# Patient Record
Sex: FEMALE | Race: WHITE | URBAN - METROPOLITAN AREA
[De-identification: names, ages, dates, MRNs, and addresses within clinical notes are randomized per-mention and may not be internally consistent; named-entity substitution may affect disease eponyms.]

---

## 2019-09-21 ENCOUNTER — APPOINTMENT (OUTPATIENT)
Dept: CT IMAGING | Facility: CLINIC | Age: 54
DRG: 897 | End: 2019-09-21
Attending: EMERGENCY MEDICINE
Payer: COMMERCIAL

## 2019-09-21 ENCOUNTER — APPOINTMENT (OUTPATIENT)
Dept: GENERAL RADIOLOGY | Facility: CLINIC | Age: 54
DRG: 897 | End: 2019-09-21
Attending: EMERGENCY MEDICINE
Payer: COMMERCIAL

## 2019-09-21 ENCOUNTER — HOSPITAL ENCOUNTER (INPATIENT)
Facility: CLINIC | Age: 54
LOS: 3 days | Discharge: HOME | DRG: 897 | End: 2019-09-24
Attending: EMERGENCY MEDICINE | Admitting: PSYCHIATRY & NEUROLOGY
Payer: COMMERCIAL

## 2019-09-21 DIAGNOSIS — F10.129 CHRONIC ALCOHOLIC INTOXICATION WITH COMPLICATION (H): ICD-10-CM

## 2019-09-21 DIAGNOSIS — S09.90XA INJURY OF HEAD, INITIAL ENCOUNTER: ICD-10-CM

## 2019-09-21 DIAGNOSIS — S42.034K: ICD-10-CM

## 2019-09-21 DIAGNOSIS — L03.311 ABDOMINAL WALL CELLULITIS: ICD-10-CM

## 2019-09-21 DIAGNOSIS — Z91.81 PERSONAL HISTORY OF FALL: ICD-10-CM
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
## 2019-09-21 DIAGNOSIS — F10.920 ALCOHOLIC INTOXICATION WITHOUT COMPLICATION (H): ICD-10-CM

## 2019-09-21 DIAGNOSIS — S22.42XA CLOSED FRACTURE OF MULTIPLE RIBS OF LEFT SIDE, INITIAL ENCOUNTER: ICD-10-CM

## 2019-09-21 DIAGNOSIS — S42.001S CLOSED NONDISPLACED FRACTURE OF RIGHT CLAVICLE, UNSPECIFIED PART OF CLAVICLE, SEQUELA: Primary | ICD-10-CM

## 2019-09-21 DIAGNOSIS — S42.031K CLOSED DISPLACED FRACTURE OF ACROMIAL END OF RIGHT CLAVICLE WITH NONUNION, SUBSEQUENT ENCOUNTER: ICD-10-CM

## 2019-09-21 PROBLEM — F10.230 ALCOHOL DEPENDENCE WITH UNCOMPLICATED WITHDRAWAL (H): Status: ACTIVE | Noted: 2019-09-21

## 2019-09-21 LAB
ALBUMIN SERPL-MCNC: 3.4 G/DL (ref 3.4–5)
ALBUMIN UR-MCNC: NEGATIVE MG/DL
ALCOHOL BREATH TEST: ABNORMAL (ref 0–0.01)
ALP SERPL-CCNC: 65 U/L (ref 40–150)
ALT SERPL W P-5'-P-CCNC: 48 U/L (ref 0–50)
AMPHETAMINES UR QL SCN: NEGATIVE
ANION GAP SERPL CALCULATED.3IONS-SCNC: 9 MMOL/L (ref 3–14)
APPEARANCE UR: CLEAR
AST SERPL W P-5'-P-CCNC: ABNORMAL U/L (ref 0–45)
BARBITURATES UR QL: NEGATIVE
BASOPHILS # BLD AUTO: 0 10E9/L (ref 0–0.2)
BASOPHILS NFR BLD AUTO: 0.2 %
BENZODIAZ UR QL: NEGATIVE
BILIRUB SERPL-MCNC: 0.3 MG/DL (ref 0.2–1.3)
BILIRUB UR QL STRIP: NEGATIVE
BUN SERPL-MCNC: 11 MG/DL (ref 7–30)
CALCIUM SERPL-MCNC: 8.9 MG/DL (ref 8.5–10.1)
CANNABINOIDS UR QL SCN: NEGATIVE
CHLORIDE SERPL-SCNC: 99 MMOL/L (ref 94–109)
CO2 SERPL-SCNC: 31 MMOL/L (ref 20–32)
COCAINE UR QL: NEGATIVE
COLOR UR AUTO: YELLOW
CREAT SERPL-MCNC: 0.38 MG/DL (ref 0.52–1.04)
DIFFERENTIAL METHOD BLD: ABNORMAL
EOSINOPHIL # BLD AUTO: 0 10E9/L (ref 0–0.7)
EOSINOPHIL NFR BLD AUTO: 0.3 %
ERYTHROCYTE [DISTWIDTH] IN BLOOD BY AUTOMATED COUNT: 14.8 % (ref 10–15)
ETHANOL UR QL SCN: POSITIVE
GFR SERPL CREATININE-BSD FRML MDRD: >90 ML/MIN/{1.73_M2}
GLUCOSE SERPL-MCNC: 81 MG/DL (ref 70–99)
GLUCOSE UR STRIP-MCNC: 150 MG/DL
HCT VFR BLD AUTO: 37.9 % (ref 35–47)
HGB BLD-MCNC: 13 G/DL (ref 11.7–15.7)
HGB UR QL STRIP: NEGATIVE
IMM GRANULOCYTES # BLD: 0 10E9/L (ref 0–0.4)
IMM GRANULOCYTES NFR BLD: 0.5 %
INR PPP: 0.97 (ref 0.86–1.14)
KETONES UR STRIP-MCNC: NEGATIVE MG/DL
LEUKOCYTE ESTERASE UR QL STRIP: NEGATIVE
LYMPHOCYTES # BLD AUTO: 2 10E9/L (ref 0.8–5.3)
LYMPHOCYTES NFR BLD AUTO: 30.5 %
MAGNESIUM SERPL-MCNC: 1.8 MG/DL (ref 1.6–2.3)
MCH RBC QN AUTO: 34.7 PG (ref 26.5–33)
MCHC RBC AUTO-ENTMCNC: 34.3 G/DL (ref 31.5–36.5)
MCV RBC AUTO: 101 FL (ref 78–100)
MONOCYTES # BLD AUTO: 0.7 10E9/L (ref 0–1.3)
MONOCYTES NFR BLD AUTO: 11.2 %
MUCOUS THREADS #/AREA URNS LPF: PRESENT /LPF
NEUTROPHILS # BLD AUTO: 3.8 10E9/L (ref 1.6–8.3)
NEUTROPHILS NFR BLD AUTO: 57.3 %
NITRATE UR QL: NEGATIVE
NRBC # BLD AUTO: 0 10*3/UL
NRBC BLD AUTO-RTO: 0 /100
OPIATES UR QL SCN: NEGATIVE
PH UR STRIP: 6.5 PH (ref 5–7)
PLATELET # BLD AUTO: 285 10E9/L (ref 150–450)
POTASSIUM SERPL-SCNC: 3.3 MMOL/L (ref 3.4–5.3)
POTASSIUM SERPL-SCNC: 6.2 MMOL/L (ref 3.4–5.3)
PROT SERPL-MCNC: 7.3 G/DL (ref 6.8–8.8)
RBC # BLD AUTO: 3.75 10E12/L (ref 3.8–5.2)
RBC #/AREA URNS AUTO: <1 /HPF (ref 0–2)
SODIUM SERPL-SCNC: 139 MMOL/L (ref 133–144)
SOURCE: ABNORMAL
SP GR UR STRIP: 1.02 (ref 1–1.03)
SQUAMOUS #/AREA URNS AUTO: 3 /HPF (ref 0–1)
UROBILINOGEN UR STRIP-MCNC: NORMAL MG/DL (ref 0–2)
VALPROATE SERPL-MCNC: 61 MG/L (ref 50–100)
WBC # BLD AUTO: 6.6 10E9/L (ref 4–11)
WBC #/AREA URNS AUTO: <1 /HPF (ref 0–5)

## 2019-09-21 PROCEDURE — 80320 DRUG SCREEN QUANTALCOHOLS: CPT | Performed by: EMERGENCY MEDICINE

## 2019-09-21 PROCEDURE — 85610 PROTHROMBIN TIME: CPT | Performed by: EMERGENCY MEDICINE

## 2019-09-21 PROCEDURE — 25000125 ZZHC RX 250: Performed by: EMERGENCY MEDICINE

## 2019-09-21 PROCEDURE — 25000132 ZZH RX MED GY IP 250 OP 250 PS 637: Performed by: EMERGENCY MEDICINE

## 2019-09-21 PROCEDURE — 82565 ASSAY OF CREATININE: CPT

## 2019-09-21 PROCEDURE — 80164 ASSAY DIPROPYLACETIC ACD TOT: CPT | Performed by: EMERGENCY MEDICINE

## 2019-09-21 PROCEDURE — 83735 ASSAY OF MAGNESIUM: CPT | Performed by: EMERGENCY MEDICINE

## 2019-09-21 PROCEDURE — HZ2ZZZZ DETOXIFICATION SERVICES FOR SUBSTANCE ABUSE TREATMENT: ICD-10-PCS | Performed by: PSYCHIATRY & NEUROLOGY

## 2019-09-21 PROCEDURE — 84155 ASSAY OF PROTEIN SERUM: CPT

## 2019-09-21 PROCEDURE — 25000131 ZZH RX MED GY IP 250 OP 636 PS 637: Performed by: EMERGENCY MEDICINE

## 2019-09-21 PROCEDURE — 84132 ASSAY OF SERUM POTASSIUM: CPT

## 2019-09-21 PROCEDURE — 84295 ASSAY OF SERUM SODIUM: CPT

## 2019-09-21 PROCEDURE — 84132 ASSAY OF SERUM POTASSIUM: CPT | Performed by: EMERGENCY MEDICINE

## 2019-09-21 PROCEDURE — 84075 ASSAY ALKALINE PHOSPHATASE: CPT

## 2019-09-21 PROCEDURE — 82435 ASSAY OF BLOOD CHLORIDE: CPT

## 2019-09-21 PROCEDURE — 73030 X-RAY EXAM OF SHOULDER: CPT | Mod: RT

## 2019-09-21 PROCEDURE — 12800008 ZZH R&B CD ADULT

## 2019-09-21 PROCEDURE — 84520 ASSAY OF UREA NITROGEN: CPT

## 2019-09-21 PROCEDURE — 84460 ALANINE AMINO (ALT) (SGPT): CPT

## 2019-09-21 PROCEDURE — 25800025 ZZH RX 258: Performed by: EMERGENCY MEDICINE

## 2019-09-21 PROCEDURE — 99285 EMERGENCY DEPT VISIT HI MDM: CPT | Mod: 25 | Performed by: EMERGENCY MEDICINE

## 2019-09-21 PROCEDURE — 82075 ASSAY OF BREATH ETHANOL: CPT | Performed by: EMERGENCY MEDICINE

## 2019-09-21 PROCEDURE — 25000128 H RX IP 250 OP 636: Performed by: EMERGENCY MEDICINE

## 2019-09-21 PROCEDURE — 93010 ELECTROCARDIOGRAM REPORT: CPT | Mod: Z6 | Performed by: EMERGENCY MEDICINE

## 2019-09-21 PROCEDURE — 82947 ASSAY GLUCOSE BLOOD QUANT: CPT

## 2019-09-21 PROCEDURE — 96366 THER/PROPH/DIAG IV INF ADDON: CPT | Performed by: EMERGENCY MEDICINE

## 2019-09-21 PROCEDURE — 96365 THER/PROPH/DIAG IV INF INIT: CPT | Mod: 59 | Performed by: EMERGENCY MEDICINE

## 2019-09-21 PROCEDURE — 80307 DRUG TEST PRSMV CHEM ANLYZR: CPT | Performed by: EMERGENCY MEDICINE

## 2019-09-21 PROCEDURE — 74177 CT ABD & PELVIS W/CONTRAST: CPT

## 2019-09-21 PROCEDURE — 82040 ASSAY OF SERUM ALBUMIN: CPT

## 2019-09-21 PROCEDURE — 82374 ASSAY BLOOD CARBON DIOXIDE: CPT

## 2019-09-21 PROCEDURE — 85025 COMPLETE CBC W/AUTO DIFF WBC: CPT | Performed by: EMERGENCY MEDICINE

## 2019-09-21 PROCEDURE — 71260 CT THORAX DX C+: CPT

## 2019-09-21 PROCEDURE — 82247 BILIRUBIN TOTAL: CPT

## 2019-09-21 PROCEDURE — 72125 CT NECK SPINE W/O DYE: CPT

## 2019-09-21 PROCEDURE — 93005 ELECTROCARDIOGRAM TRACING: CPT | Performed by: EMERGENCY MEDICINE

## 2019-09-21 PROCEDURE — 81001 URINALYSIS AUTO W/SCOPE: CPT | Performed by: EMERGENCY MEDICINE

## 2019-09-21 PROCEDURE — 25000132 ZZH RX MED GY IP 250 OP 250 PS 637: Performed by: PSYCHIATRY & NEUROLOGY

## 2019-09-21 PROCEDURE — 70450 CT HEAD/BRAIN W/O DYE: CPT

## 2019-09-21 PROCEDURE — 82310 ASSAY OF CALCIUM: CPT

## 2019-09-21 RX ORDER — BISACODYL 10 MG
10 SUPPOSITORY, RECTAL RECTAL DAILY PRN
Status: DISCONTINUED | OUTPATIENT
Start: 2019-09-21 | End: 2019-09-24 | Stop reason: HOSPADM

## 2019-09-21 RX ORDER — LANOLIN ALCOHOL/MO/W.PET/CERES
100 CREAM (GRAM) TOPICAL DAILY
Status: DISCONTINUED | OUTPATIENT
Start: 2019-09-22 | End: 2019-09-24 | Stop reason: HOSPADM

## 2019-09-21 RX ORDER — MULTIPLE VITAMINS W/ MINERALS TAB 9MG-400MCG
1 TAB ORAL DAILY
Status: DISCONTINUED | OUTPATIENT
Start: 2019-09-22 | End: 2019-09-24 | Stop reason: HOSPADM

## 2019-09-21 RX ORDER — BISACODYL 10 MG
10 SUPPOSITORY, RECTAL RECTAL ONCE
Status: COMPLETED | OUTPATIENT
Start: 2019-09-21 | End: 2019-09-21

## 2019-09-21 RX ORDER — ACETAMINOPHEN 325 MG/1
650 TABLET ORAL EVERY 4 HOURS PRN
Status: DISCONTINUED | OUTPATIENT
Start: 2019-09-21 | End: 2019-09-24 | Stop reason: HOSPADM

## 2019-09-21 RX ORDER — ATENOLOL 50 MG/1
50 TABLET ORAL DAILY PRN
Status: DISCONTINUED | OUTPATIENT
Start: 2019-09-21 | End: 2019-09-24 | Stop reason: HOSPADM

## 2019-09-21 RX ORDER — HYDROXYZINE HYDROCHLORIDE 25 MG/1
25 TABLET, FILM COATED ORAL EVERY 4 HOURS PRN
Status: DISCONTINUED | OUTPATIENT
Start: 2019-09-21 | End: 2019-09-24 | Stop reason: HOSPADM

## 2019-09-21 RX ORDER — TRAZODONE HYDROCHLORIDE 100 MG/1
300 TABLET ORAL AT BEDTIME
Status: ON HOLD | COMMUNITY
End: 2019-09-24

## 2019-09-21 RX ORDER — PAROXETINE 10 MG/1
20 TABLET, FILM COATED ORAL EVERY EVENING
Status: DISCONTINUED | OUTPATIENT
Start: 2019-09-21 | End: 2019-09-24 | Stop reason: HOSPADM

## 2019-09-21 RX ORDER — IOPAMIDOL 755 MG/ML
100 INJECTION, SOLUTION INTRAVASCULAR ONCE
Status: COMPLETED | OUTPATIENT
Start: 2019-09-21 | End: 2019-09-21

## 2019-09-21 RX ORDER — LOPERAMIDE HCL 2 MG
2 CAPSULE ORAL 4 TIMES DAILY PRN
Status: DISCONTINUED | OUTPATIENT
Start: 2019-09-21 | End: 2019-09-24 | Stop reason: HOSPADM

## 2019-09-21 RX ORDER — DIVALPROEX SODIUM 125 MG/1
125 TABLET, DELAYED RELEASE ORAL 3 TIMES DAILY
COMMUNITY
End: 2019-09-21

## 2019-09-21 RX ORDER — PAROXETINE 20 MG/1
20 TABLET, FILM COATED ORAL EVERY EVENING
Status: ON HOLD | COMMUNITY
End: 2019-09-24

## 2019-09-21 RX ORDER — CLINDAMYCIN HCL 300 MG
300 CAPSULE ORAL EVERY 8 HOURS SCHEDULED
Status: DISCONTINUED | OUTPATIENT
Start: 2019-09-21 | End: 2019-09-21

## 2019-09-21 RX ORDER — ONDANSETRON 4 MG/1
4 TABLET, ORALLY DISINTEGRATING ORAL ONCE
Status: COMPLETED | OUTPATIENT
Start: 2019-09-21 | End: 2019-09-21

## 2019-09-21 RX ORDER — CLINDAMYCIN HCL 300 MG
300 CAPSULE ORAL EVERY 8 HOURS SCHEDULED
Status: DISCONTINUED | OUTPATIENT
Start: 2019-09-21 | End: 2019-09-22

## 2019-09-21 RX ORDER — ALUMINA, MAGNESIA, AND SIMETHICONE 2400; 2400; 240 MG/30ML; MG/30ML; MG/30ML
30 SUSPENSION ORAL EVERY 4 HOURS PRN
Status: DISCONTINUED | OUTPATIENT
Start: 2019-09-21 | End: 2019-09-24 | Stop reason: HOSPADM

## 2019-09-21 RX ORDER — TRAZODONE HYDROCHLORIDE 150 MG/1
300 TABLET ORAL AT BEDTIME
Status: DISCONTINUED | OUTPATIENT
Start: 2019-09-21 | End: 2019-09-24 | Stop reason: HOSPADM

## 2019-09-21 RX ORDER — FOLIC ACID 1 MG/1
1 TABLET ORAL DAILY
Status: DISCONTINUED | OUTPATIENT
Start: 2019-09-22 | End: 2019-09-24 | Stop reason: HOSPADM

## 2019-09-21 RX ORDER — LORAZEPAM 1 MG/1
1-4 TABLET ORAL EVERY 30 MIN PRN
Status: DISCONTINUED | OUTPATIENT
Start: 2019-09-21 | End: 2019-09-24 | Stop reason: HOSPADM

## 2019-09-21 RX ORDER — DIVALPROEX SODIUM 250 MG/1
750 TABLET, EXTENDED RELEASE ORAL DAILY
Status: ON HOLD | COMMUNITY
End: 2019-09-24

## 2019-09-21 RX ORDER — DIVALPROEX SODIUM 250 MG/1
750 TABLET, EXTENDED RELEASE ORAL EVERY EVENING
Status: DISCONTINUED | OUTPATIENT
Start: 2019-09-21 | End: 2019-09-24 | Stop reason: HOSPADM

## 2019-09-21 RX ORDER — ONDANSETRON 4 MG/1
4 TABLET, ORALLY DISINTEGRATING ORAL EVERY 6 HOURS PRN
Status: DISCONTINUED | OUTPATIENT
Start: 2019-09-21 | End: 2019-09-22

## 2019-09-21 RX ORDER — DIVALPROEX SODIUM 250 MG/1
750 TABLET, DELAYED RELEASE ORAL DAILY
Status: ON HOLD | COMMUNITY
End: 2019-09-21

## 2019-09-21 RX ORDER — IBUPROFEN 600 MG/1
600 TABLET, FILM COATED ORAL EVERY 6 HOURS PRN
Status: DISCONTINUED | OUTPATIENT
Start: 2019-09-21 | End: 2019-09-24 | Stop reason: HOSPADM

## 2019-09-21 RX ADMIN — SODIUM CHLORIDE 54 ML: 9 INJECTION, SOLUTION INTRAVENOUS at 14:59

## 2019-09-21 RX ADMIN — ONDANSETRON 4 MG: 4 TABLET, ORALLY DISINTEGRATING ORAL at 19:25

## 2019-09-21 RX ADMIN — BISACODYL 10 MG: 10 SUPPOSITORY RECTAL at 18:53

## 2019-09-21 RX ADMIN — CLINDAMYCIN HYDROCHLORIDE 300 MG: 300 CAPSULE ORAL at 17:25

## 2019-09-21 RX ADMIN — LORAZEPAM 2 MG: 1 TABLET ORAL at 21:03

## 2019-09-21 RX ADMIN — PAROXETINE 20 MG: 10 TABLET, FILM COATED ORAL at 21:03

## 2019-09-21 RX ADMIN — TRAZODONE HYDROCHLORIDE 300 MG: 150 TABLET, FILM COATED ORAL at 21:03

## 2019-09-21 RX ADMIN — DIVALPROEX SODIUM 750 MG: 250 TABLET, FILM COATED, EXTENDED RELEASE ORAL at 21:03

## 2019-09-21 RX ADMIN — IOPAMIDOL 54 ML: 755 INJECTION, SOLUTION INTRAVENOUS at 14:58

## 2019-09-21 RX ADMIN — FOLIC ACID: 5 INJECTION, SOLUTION INTRAMUSCULAR; INTRAVENOUS; SUBCUTANEOUS at 13:10

## 2019-09-21 ASSESSMENT — ENCOUNTER SYMPTOMS
ARTHRALGIAS: 1
ABDOMINAL PAIN: 1
HEADACHES: 1

## 2019-09-21 ASSESSMENT — ACTIVITIES OF DAILY LIVING (ADL)
HYGIENE/GROOMING: INDEPENDENT
DRESS: STREET CLOTHES;SCRUBS (BEHAVIORAL HEALTH);INDEPENDENT
LAUNDRY: WITH SUPERVISION
ORAL_HYGIENE: INDEPENDENT

## 2019-09-21 NOTE — ED NOTES
ED to Behavioral Floor Handoff    SITUATION  Celia Jeronimo is a 54 year old female who speaks English and lives in a home with a spouse The patient arrived in the ED by private car from home with a complaint of Alcohol Intoxication (Blew a 0.199, Seeking detox.  Drank 2 beers this AM.  Drank 1 pint vodka yesterday.  Had first drink at age 9 - long history with alcohol she said.  Hx of seizures.  Says she brought her meds and has some mental health problems as well.)  .The patient's current symptoms started/worsened 1 month(s) ago and during this time the symptoms have increased.   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 No   If yes, last blood glucose? --     If yes, was this treated in the ED? --  --------  Is the patient inebriated (ETOH) Yes or Impaired on other substances? No  MSSA done? Yes  Last MSSA score: 4    Were withdrawal symptoms treated? No  Does the patient have a seizure history? Yes. If yes, date of most recent seizure-- August 2018  --------  Is the patient patient experiencing suicidal ideation? denies current or recent suicidal ideation     Homicidal ideation? denies current or recent homicidal ideation or behaviors.    Self-injurious behavior/urges? denies current or recent self injurious behavior or ideation.  ------  Was pt aggressive in the ED No  Was a code called No  Is the pt now cooperative? Yes  -------  Meds given in ED:   Medications   clindamycin (CLEOCIN) capsule 300 mg (300 mg Oral Given 9/21/19 1725)   dextrose 5% and 0.45% NaCl 1,000 mL with INFUVITE ADULT 10 mL,  thiamine 100 mg, folic acid 1 mg infusion ( Intravenous Stopped 9/21/19 1502)   iopamidol (ISOVUE-370) solution 100 mL (54 mLs Intravenous Given 9/21/19 1458)   sodium chloride 0.9 % bag 500mL for CT scan flush use (54 mLs Intravenous Given 9/21/19 1459)      Family present during ED course? Yes  Family currently present? No    BACKGROUND  Does the patient have a cognitive impairment or developmental disability? No  Allergies:   Allergies   Allergen Reactions     Penicillins Swelling     PN: allergic to mold, mold allergies got worse after she took Penicillin  allergic to mold, mold allergies got worse after she took Penicillin,        Macrolides      Metronidazole Nausea and Vomiting     Telbivudine      Valium [Diazepam] Other (See Comments)     manic     Clarithromycin Other (See Comments) and Rash     PN:  facial skin eruption     Erythromycin Rash   .   Social demographics are   Social History     Socioeconomic History     Marital status:      Spouse name: None     Number of children: None     Years of education: None     Highest education level: None   Occupational History     None   Social Needs     Financial resource strain: None     Food insecurity:     Worry: None     Inability: None     Transportation needs:     Medical: None     Non-medical: None   Tobacco Use     Smoking status: Current Every Day Smoker     Packs/day: 0.50     Smokeless tobacco: Never Used   Substance and Sexual Activity     Alcohol use: Yes     Comment: drinks daily, pint a day     Drug use: Never     Sexual activity: None   Lifestyle     Physical activity:     Days per week: None     Minutes per session: None     Stress: None   Relationships     Social connections:     Talks on phone: None     Gets together: None     Attends [employer] service: None     Active member of club or organization: None     Attends meetings of clubs or organizations: None     Relationship status: None     Intimate partner violence:     Fear of current  or ex partner: None     Emotionally abused: None     Physically abused: None     Forced sexual activity: None   Other Topics Concern     None   Social History Narrative     None        ASSESSMENT  Labs results   Labs Ordered and Resulted from Time of ED Arrival Up to the Time of Departure from the ED   CBC WITH PLATELETS DIFFERENTIAL - Abnormal; Notable for the following components:       Result Value    RBC Count 3.75 (*)      (*)     MCH 34.7 (*)     All other components within normal limits   COMPREHENSIVE METABOLIC PANEL - Abnormal; Notable for the following components:    Potassium 6.2 (*)     Creatinine 0.38 (*)     All other components within normal limits   ROUTINE UA WITH MICROSCOPIC - Abnormal; Notable for the following components:    Glucose Urine 150 (*)     Squamous Epithelial /HPF Urine 3 (*)     Mucous Urine Present (*)     All other components within normal limits   DRUG ABUSE SCREEN 6 CHEM DEP URINE ([facility]) - Abnormal; Notable for the following components:    Ethanol Qual Urine Positive (*)     All other components within normal limits   POTASSIUM - Abnormal; Notable for the following components:    Potassium 3.3 (*)     All other components within normal limits   ALCOHOL BREATH TEST POCT - Abnormal   INR   MAGNESIUM   VALPROIC ACID      Imaging Studies:   Recent Results (from the past 24 hour(s))   XR Shoulder Right G/E 3 Views    Narrative    RIGHT SHOULDER THREE OR MORE VIEWS   9/21/2019 2:44 PM     HISTORY: Right shoulder pain status post fall.    COMPARISON: None.     FINDINGS:  There is a chronic distal clavicular fracture at the level  of the coracoclavicular ligament origin with inferior displacement of  the distal fragment. No acute fracture or malalignment is seen.  Acromioclavicular and glenohumeral spaces are well-maintained. There  is mild osteopenia. Visualized portions of the lungs are grossly  clear. Humeral head is well located within the glenoid fossa.      Impression     IMPRESSION:  1. Displaced, chronic distal clavicular fracture with nonunion.  2. No acute fracture is identified. No obvious acute malalignment.    CT Chest/Abdomen/Pelvis w Contrast    Narrative    CT CHEST, ABDOMEN, PELVIS WITH CONTRAST  9/21/2019 2:59 PM    HISTORY: Right clavicle pain, bilateral rib pain, epigastric pain  status post multiple falls, intoxicated.    TECHNIQUE: CT scan obtained of the chest, abdomen, and pelvis with  oral and IV contrast. 54 mL Isovue 370 IV injected. Radiation dose for  this scan was reduced using automated exposure control, adjustment of  the mA and/or kV according to patient size, or iterative  reconstruction technique.    COMPARISON:  None available.    FINDINGS:  Chest: Visualized portions of the thyroid gland are unremarkable.    No supraclavicular, axillary, mediastinal or hilar lymphadenopathy is  seen.    Heart size is normal. No pericardial effusion is seen. Scattered  coronary artery calcifications are present. Scattered atherosclerotic  calcification seen in the thoracic aorta. Pulmonary trunk is of normal  course and caliber.    No pleural effusion or pneumothorax is seen. Dependent subsegmental  atelectasis is seen in the bilateral lower lobes. No focal  consolidation is present. Calcified granuloma noted within the left  upper lobe.    Abdomen/pelvis: Diffuse fatty infiltration seen in the liver. No  intrahepatic or extrahepatic biliary ductal dilatation is present.  Gallbladder is unremarkable.    The spleen, adrenal glands and pancreas are unremarkable. Kidneys  enhance symmetrically. No hydronephrosis is seen.    Stomach is moderately distended with ingested contents and air. Small  and large bowel loops are nondilated. Postsurgical changes are noted  at the rectosigmoid junction. Scattered colonic diverticulosis without  evidence of diverticulitis. Mild colonic wall thickening noted within  the ascending colon and cecum. No significant adjacent pericolonic  fat  stranding seen.    Scattered atherosclerotic calcifications seen in the abdominal aorta.  IVC is of normal course and caliber. No retroperitoneal or mesenteric  lymphadenopathy is seen.    Urinary bladder is moderately distended. Uterus is surgically absent.  No pelvic lymphadenopathy is seen.    Displaced clavicular fragments from chronic distal clavicular fracture  with nonunion. Multiple healing and healed fractures noted along the  left lateral and posterior ribs. No suspicious osseous lesions seen.  Incomplete fusion involving the right transverse process of the L3  vertebral level.      Impression    IMPRESSION:   1.  Mild wall thickening involving the ascending colon and cecum. No  significant pericolonic fat stranding is seen. Findings may suggest  mild infectious versus inflammatory colitis.  2.  Displaced, chronic right distal clavicular fracture with nonunion.  3.  Multiple subacute to chronic left lateral and posterior rib  fractures.  4.  Diffuse hepatic steatosis.    KATHY MCGRATH MD   Head CT w/o contrast    Narrative    CT SCAN OF THE HEAD WITHOUT CONTRAST   9/21/2019 3:11 PM     HISTORY: Numerous falls with head trauma, intoxicated.    TECHNIQUE:  Axial images of the head and coronal reformations without  IV contrast material. Radiation dose for this scan was reduced using  automated exposure control, adjustment of the mA and/or kV according  to patient size, or iterative reconstruction technique.    COMPARISON: None.    FINDINGS: There is no evidence of intracranial hemorrhage, mass, acute  infarct or anomaly. The ventricles are normal in size, shape and  configuration. Mild diffuse parenchymal volume loss. Mild patchy  periventricular white matter hypodensities which are nonspecific, but  likely related to chronic microvascular ischemic disease.     Sequela of previous sinonasal surgery. Marked mucosal thickening in  the visualized left maxillary sinus. The bony calvarium and bones of  the  skull base appear intact.       Impression    IMPRESSION:     1. No evidence of acute intracranial hemorrhage, mass, or herniation.  2. Mild diffuse parenchymal volume loss and white matter changes  likely due to chronic microvascular ischemic disease.      YESICA RAVI MD   CT Cervical Spine w/o Contrast    Narrative    CT CERVICAL SPINE WITHOUT CONTRAST   9/21/2019 3:13 PM     HISTORY: Multiple falls, diffuse trauma, intoxicated.     TECHNIQUE: Axial images of the cervical spine were obtained without  intravenous contrast. Multiplanar reformations were performed.   Radiation dose for this scan was reduced using automated exposure  control, adjustment of the mA and/or kV according to patient size, or  iterative reconstruction technique.    COMPARISON: None.    FINDINGS: No evidence of fracture. No prevertebral soft tissue  swelling. Straightening of the normal cervical lordosis. Vertebral  body height is maintained. No destructive osseous lesions. Severe loss  of intervertebral disc space and degenerative endplate changes at the  C5-6 level. Mild degenerative changes and loss of disc height at C3-4  and C4-5. Mild spinal canal narrowing at the C5-6 level particularly  on the left. Severe left and moderate right neural foraminal narrowing  at C5-6.    Visualized paraspinous tissues: Unremarkable.      Impression    IMPRESSION:   1. No evidence of acute fracture or subluxation in the cervical spine.  2. Degenerative changes in the cervical spine most pronounced at C5-6  level.       YESICA RAVI MD      Most recent vital signs /65   Pulse 81   Temp 97.8  F (36.6  C) (Oral)   Resp 16   Wt 50.3 kg (111 lb)   SpO2 94%    Abnormal labs/tests/findings requiring intervention:---   Pain control: pt had none  Nausea control: pt had none    RECOMMENDATION  Are any infection precautions needed (MRSA, VRE, etc.)? No If yes, what infection? --  ---  Does the patient have mobility issues? independently. If yes, what  device does the pt use? ---  ---  Is patient on 72 hour hold or commitment? No If on 72 hour hold, have hold and rights been given to patient? N/A  Are admitting orders written if after 10 p.m. ?N/A  Tasks needing to be completed:---     Gayatri Wong, RN   ED RN [id number]

## 2019-09-21 NOTE — ED NOTES
Patient was able to eat supper independently. No c/o nausea. Patient was able to ambulate independently to toilet.

## 2019-09-21 NOTE — PHARMACY-ADMISSION MEDICATION HISTORY
Admission medication history for the September 21, 2019 admission is complete.     Interview Sources:  Patient and Outside Dispense Report    Reliability of Source: Good, patient knew most medications and dosing.    Medication Adherence: Good, patient fills medication every month and reports good adherence.    Current Outpatient Pharmacy: [facility] - [address]      Changes made to PTA medication list (reason)  Added: None  Deleted: None  Changed: paroxetine 20 mg; take 1 tablet every morning --> paroxetine 20 mg; take 1 tablet every evening (per patient, takes all her medication at night)    Additional medication history information:   None    Prior to Admission Medication List:  Prior to Admission medications    Medication Sig Last Dose Taking? Auth Provider   divalproex sodium extended-release (DEPAKOTE) 250 MG ER tablet Take 750 mg by mouth daily 9/20/2019 at pm Yes Reported, Patient   PARoxetine (PAXIL) 20 MG tablet Take 20 mg by mouth every evening  9/20/2019 at pm Yes Reported, Patient   traZODone (DESYREL) 100 MG tablet Take 300 mg by mouth At Bedtime 9/20/2019 at pm Yes Reported, Patient       Time spent: 20 minutes    Medication history completed by:   Jody Garnica, Pharmacy Intern

## 2019-09-21 NOTE — ED PROVIDER NOTES
"    [facility] EMERGENCY DEPARTMENT ([facility])    9/21/19     ED 11 12:24 PM   History     Chief Complaint   Patient presents with     Alcohol Intoxication     Blew a 0.199, Seeking detox.  Drank 2 beers this AM.  Drank 1 pint vodka yesterday.  Had first drink at age 9 - long history with alcohol she said.  Hx of seizures.  Says she brought her meds and has some mental health problems as well.     The history is provided by the patient and medical records.     Celia Jeronimo is a 54 year old female who presents seeking detox from alcohol. She has a history of alcohol abuse as well as seizures though felt to be unrelated to withdrawal.  She has been drinking 1 pint of vodka daily 4-6 days out of the week.  She last drank vodka last night and reports drinking 2 beers this morning prior to arrival (~10:30 am).  She also has multiple falls with multiple bruises but can't recall when these falls took place or the nature of the falls themselves. She has an abrasion on her abdomen after falling yesterday or the day before.  She states she fell over an antique chair and broke this, is worried for infection with possible wood splinters embedded in her abdomen. She has had multiple head injuries from falls as well, has some tenderness to back of her head. She also has some right shoulder injuries after fall with injury, wonders if she needs surgery for this. She has had prior surgery in this right shoulder but has noticed \"crumbly\" sensation in right shoulder.  She notes history of grand mal seizure in last year while watching a movie. At that time she was sober for several days prior to the seizure. She states she was inpatient for a week with this (per [facility] took place from 12/6-12/9/2018 at [facility].  At that time she had a witnessed tonic-clonic seizure in setting of mild alcohol withdrawal, sleep deprivation and subtherapeutic Depakote levels.  Work-up was entertained.  She " had abnormal EEG in July notable for left posterior temporal dysfunction epileptogenicity.  Plan was for patient to increase her Depakote levels.  No history of DTs. No other substance use. No vomiting, abdominal pain, diarrhea, bloody stools or black stools. No fevers. She notes being considered bipolar with borderline personality disorder in the past, is on Depakote, Paxil, trazodone for sleep and has these medications with her. No suicidal ideation. Is not on anticoagulation.     I have reviewed the Medications, Allergies, Past Medical and Surgical History, and Social History in the [employer] system.  PAST MEDICAL HISTORY: No past medical history on file.    PAST SURGICAL HISTORY: No past surgical history on file.    FAMILY HISTORY: No family history on file.    SOCIAL HISTORY:   Social History     Tobacco Use     Smoking status: Not on file   Substance Use Topics     Alcohol use: Not on file       Patient's Medications   New Prescriptions    No medications on file   Previous Medications    DIVALPROEX SODIUM DELAYED-RELEASE (DEPAKOTE) 125 MG DR TABLET    Take 125 mg by mouth 3 times daily   Modified Medications    No medications on file   Discontinued Medications    No medications on file          Allergies   Allergen Reactions     Penicillins Swelling     PN: allergic to mold, mold allergies got worse after she took Penicillin  allergic to mold, mold allergies got worse after she took Penicillin,        Macrolides      Metronidazole Nausea and Vomiting     Telbivudine      Valium [Diazepam] Other (See Comments)     manic     Clarithromycin Other (See Comments) and Rash     PN:  facial skin eruption     Erythromycin Rash      Review of Systems   Gastrointestinal: Positive for abdominal pain (abdominal wall).   Musculoskeletal: Positive for arthralgias (right shoulder pain).        Multiple bruises, possible cracked rib   Neurological: Positive for headaches.   All other systems reviewed and are negative.      Physical  Exam   BP: 117/80  Pulse: 81  Temp: 98.4  F (36.9  C)  Resp: 18  Weight: 50.3 kg (111 lb)  SpO2: 94 %      Physical Exam   General: patient is alert and oriented, slurring of speech  Head: ecchymosis on left forehead and normocephalic   EENT:  Tacky mucus membranes without tonsillar erythema or exudates, pupils 2 mm equal round and reactive, EOMI  Neck: supple   Cardiovascular: regular rate and rhythm, no murmur appreciated, extremities warm and well perfused, no lower extremity edema  Pulmonary: lungs clear to auscultation bilaterally, bilateral chest wall tenderness to palpation  Abdomen: soft, tenderness to palpation throughout the upper abdomen, abrasion with surrounding erythema of the epigastrum, no guarding  Musculoskeletal: Tenderness to palpation of the right shoulder and distal right clavicle, no other point bony tenderness to palpation of the extremities  Neurological: alert and oriented, moving all extremities symmetrically, no facial droop, speech slurred, strength 5/5 and symmetric in , knee flexion/extension and ankle plantar/dorsiflexion, sensation to light touch in distal upper and lower extremities intact, normal gait  Skin: warm, dry, abrasion on anterior abdomen with surrounding erythema, no FB noted, scattered ecchymoses on the extremities      ED Course        Procedures             EKG Interpretation:      Interpreted by Shaylee Proctor MD  Time reviewed: 1255  Symptoms at time of EKG: none   Rhythm: normal sinus   Rate: normal  Axis: normal  Ectopy: none  Conduction: normal  ST Segments/ T Waves: No ST-T wave changes  Q Waves: none  Comparison to prior: No old EKG available    Clinical Impression: normal EKG          Critical Care time:  none             Results for orders placed or performed during the hospital encounter of 09/21/19 (from the past 24 hour(s))   Alcohol breath test POCT   Result Value Ref Range    Alcohol Breath Test 0.199 weak blow 0.00 - 0.01     Medications    dextrose 5% and 0.45% NaCl 1,000 mL with INFUVITE ADULT 10 mL, thiamine 100 mg, folic acid 1 mg infusion (has no administration in time range)         Assessments & Plan (with Medical Decision Making)   Patient is a 54-year-old female with a history of alcohol abuse, seizure history, long QT and bipolar 1 disorder who presents to the emergency department seeking detox for alcohol use.  Currently she is intoxicated with a breath alcohol of 0.199.  She does report numerous falls recently and cannot recall the details of all of her falls.  She does have multiple areas of bruising on her head as well as bilateral rib pain and a wound to the epigastrium from falling onto a chair.  Patient went for a CT of the head, chest, abdomen and pelvis which show subacute to chronic left lateral and posterior rib fractures but no acute fractures.  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  In addition on CT she is noted to have slight thickening of the ascending colon however no other inflammatory changes and history and exam are less consistent with infectious colitis.  Baseline labs obtained for detox which are unremarkable.  Her valproate level is therapeutic.  Urine drug screen is otherwise negative.  She does have erythema surrounding the wound on her abdomen that is quite tender to palpation. The wound was thoroughly irrigated with bacitracin placed.  Will treat with oral clindamycin.  Wound edges marked.  She is not currently experiencing any withdrawal symptoms.  She was given a banana bag in the ED.  Patient will be admitted to detox for further management.    This part of the document was transcribed by Nida Guillen Medical Scribe.      I have reviewed the nursing notes.    I have reviewed the findings, diagnosis, plan and need for follow up with the patient.    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  This document has been checked and approved by the attending provider.    I, Shaylee Sigala MD, was physically present and have reviewed and verified the accuracy of this note documented by Nida Guillen, medical scribe.       9/21/2019   [facility], [place], EMERGENCY DEPARTMENT     Shaylee Sigala MD  09/21/19 1623       Shaylee Sigala MD  09/21/19 [id number]

## 2019-09-22 LAB
CHOLEST SERPL-MCNC: 192 MG/DL
GGT SERPL-CCNC: 79 U/L (ref 0–40)
HDLC SERPL-MCNC: 89 MG/DL
LDLC SERPL CALC-MCNC: 92 MG/DL
NONHDLC SERPL-MCNC: 103 MG/DL
POTASSIUM SERPL-SCNC: 3.7 MMOL/L (ref 3.4–5.3)
TRIGL SERPL-MCNC: 54 MG/DL
TSH SERPL DL<=0.005 MIU/L-ACNC: 3.57 MU/L (ref 0.4–4)
VIT B12 SERPL-MCNC: 778 PG/ML (ref 193–986)

## 2019-09-22 PROCEDURE — 99222 1ST HOSP IP/OBS MODERATE 55: CPT | Performed by: PHYSICIAN ASSISTANT

## 2019-09-22 PROCEDURE — 25000132 ZZH RX MED GY IP 250 OP 250 PS 637: Performed by: PSYCHIATRY & NEUROLOGY

## 2019-09-22 PROCEDURE — 12800008 ZZH R&B CD ADULT

## 2019-09-22 PROCEDURE — 82977 ASSAY OF GGT: CPT | Performed by: PSYCHIATRY & NEUROLOGY

## 2019-09-22 PROCEDURE — 82607 VITAMIN B-12: CPT | Performed by: PSYCHIATRY & NEUROLOGY

## 2019-09-22 PROCEDURE — 80061 LIPID PANEL: CPT | Performed by: PSYCHIATRY & NEUROLOGY

## 2019-09-22 PROCEDURE — 84132 ASSAY OF SERUM POTASSIUM: CPT | Performed by: PSYCHIATRY & NEUROLOGY

## 2019-09-22 PROCEDURE — 99207 ZZC CONSULT E&M CHANGED TO INITIAL LEVEL: CPT | Performed by: PHYSICIAN ASSISTANT

## 2019-09-22 PROCEDURE — 25000132 ZZH RX MED GY IP 250 OP 250 PS 637: Performed by: PHYSICIAN ASSISTANT

## 2019-09-22 PROCEDURE — 84443 ASSAY THYROID STIM HORMONE: CPT | Performed by: PSYCHIATRY & NEUROLOGY

## 2019-09-22 PROCEDURE — 25000125 ZZHC RX 250: Performed by: PHYSICIAN ASSISTANT

## 2019-09-22 PROCEDURE — 99222 1ST HOSP IP/OBS MODERATE 55: CPT | Mod: AI | Performed by: PSYCHIATRY & NEUROLOGY

## 2019-09-22 PROCEDURE — 36415 COLL VENOUS BLD VENIPUNCTURE: CPT | Performed by: PSYCHIATRY & NEUROLOGY

## 2019-09-22 RX ORDER — CLINDAMYCIN HCL 300 MG
300 CAPSULE ORAL EVERY 8 HOURS SCHEDULED
Status: DISCONTINUED | OUTPATIENT
Start: 2019-09-22 | End: 2019-09-24 | Stop reason: HOSPADM

## 2019-09-22 RX ORDER — CLINDAMYCIN HCL 300 MG
300 CAPSULE ORAL EVERY 8 HOURS SCHEDULED
Status: DISCONTINUED | OUTPATIENT
Start: 2019-09-22 | End: 2019-09-22

## 2019-09-22 RX ORDER — MUPIROCIN 20 MG/G
OINTMENT TOPICAL DAILY
Status: DISCONTINUED | OUTPATIENT
Start: 2019-09-22 | End: 2019-09-24 | Stop reason: HOSPADM

## 2019-09-22 RX ADMIN — CLINDAMYCIN HYDROCHLORIDE 300 MG: 300 CAPSULE ORAL at 16:46

## 2019-09-22 RX ADMIN — DIVALPROEX SODIUM 750 MG: 250 TABLET, FILM COATED, EXTENDED RELEASE ORAL at 20:46

## 2019-09-22 RX ADMIN — LORAZEPAM 2 MG: 1 TABLET ORAL at 16:45

## 2019-09-22 RX ADMIN — CLINDAMYCIN HYDROCHLORIDE 300 MG: 300 CAPSULE ORAL at 08:39

## 2019-09-22 RX ADMIN — MUPIROCIN: 20 OINTMENT TOPICAL at 11:46

## 2019-09-22 RX ADMIN — LORAZEPAM 2 MG: 1 TABLET ORAL at 11:46

## 2019-09-22 RX ADMIN — LORAZEPAM 2 MG: 1 TABLET ORAL at 14:49

## 2019-09-22 RX ADMIN — THIAMINE HCL TAB 100 MG 100 MG: 100 TAB at 08:39

## 2019-09-22 RX ADMIN — FOLIC ACID 1 MG: 1 TABLET ORAL at 08:39

## 2019-09-22 RX ADMIN — LORAZEPAM 2 MG: 1 TABLET ORAL at 20:46

## 2019-09-22 RX ADMIN — LORAZEPAM 2 MG: 1 TABLET ORAL at 08:39

## 2019-09-22 RX ADMIN — MULTIPLE VITAMINS W/ MINERALS TAB 1 TABLET: TAB at 08:39

## 2019-09-22 RX ADMIN — PAROXETINE 20 MG: 10 TABLET, FILM COATED ORAL at 20:46

## 2019-09-22 NOTE — PLAN OF CARE
"[facility]      Celia Jeronimo is a 54 year old year old female with a chief complaint of Alcohol intoxication.       S = Situation:   Admit to 3A    B  = Background:     Celia Jeronimo is a 54 year old female admitted to the unit seeking detox from alcohol. Pt states that she drinks a pint of vodka daily. Pt states that her last drink was last night. Pt reports that she has hx of seizure not related to alcohol withdrawal. Pt  also has multiple falls with multiple bruises but can't recall when these falls took place or the nature of the falls themselves. Pt has an  abrasion on her abdomen after falling yesterday or the day before. Pt states that she has multiple head injuries from falls. Pt reports that she had bad reaction to Valium last year.     Vital Signs: /76 (BP Location: Left arm)   Pulse 81   Temp 97.8  F (36.6  C) (Oral)   Resp 16   Ht 1.676 m (5' 6\")   Wt 50.3 kg (111 lb)   SpO2 95%   BMI 17.92 kg/m        A  =  Assessment:   Pt pleasant and cooperative during admission interview. Pt reports hx of seizure. Pt denies SI/SIB.  Pt MSSA score upon admission was 11 and medicated with 2 mg of Ativan. Pt denies DT    R =   Request or Recommendation:   Pt will be monitored for withdrawal and seizure. 15 minutes check for safety.   "

## 2019-09-22 NOTE — PROGRESS NOTES
09/21/19 2019   Patient Belongings   Did you bring any home meds/supplements to the hospital?  Yes   Disposition of meds  Sent to security/pharmacy per site process  (Given to RN on unit)   Patient Belongings   (In bin on even unit)   Belongings Search Yes   Clothing Search Yes   Second Staff Rosalina WHITLOCK (3AW Psych Associate)       Patient Belongings in EVEN Storage Bin:  Jeans, button-down shirt, strappy sandals, belt, jewelry in small plastic bag, [place] 's License, [employer] Insurance Card, loose change, miscellaneous cards, paperwork, and receipts, black backpack, books, bookmark, cigarettes, lighter, toothbrush, toothpaste.    In Medical Bin on unit:  Keys, wallet.     Given to Nurse / Envelope #[id number]:  Trazodone Tab 100mg in bottle  Paroxetine Tab 20mg in bottle  Divalproex Tab 250mg in bottle     Sent to Security / Envelope #[id number]:  [place] EBT Card #[id number]  [employer] Debit #[id number]    A               Admission:  I am responsible for any personal items that are not sent to the safe or pharmacy.  [facility] is not responsible for loss, theft or damage of any property in my possession.    Signature:  _________________________________ Date: _______  Time: _____                                              Staff Signature:  ____________________________ Date: ________  Time: _____      2nd Staff person, if patient is unable/unwilling to sign:    Signature: ________________________________ Date: ________  Time: _____     Discharge:  [facility] has returned all of my personal belongings:    Signature: _________________________________ Date: ________  Time: _____                                          Staff Signature:  ____________________________ Date: ________  Time: _____

## 2019-09-22 NOTE — H&P
[facility], [place]   Detox Intake Note  Admission date: 9/21/2019            HPI:     Celia is a 54 year old female with history of alcohol use disorder, mood disorder, seizure disorder history and borderline personality disorder who is admitted on a voluntary basis for detox from alcohol. The patient has been drinking about 1 pint of vodka daily for the past 4-6 days, last drink morning prior to admission. Reports indicate a history of seizures in the context of alcohol withdrawal, sleep deprevation, and subtheraputic Depakote levels. Denies any history of DTs. Denies current SI/HI/AH/VH. Takes Depakote 750 mg, Trazodone 300 mg, and Paxil 20 mg and would want to continue with these medications.             Substance Use and History:      Besides alcohol , denies other substance use. BAL 0.199 on admission. Utox was otherwise negative.         Past Medical History:   PAST MEDICAL HISTORY:   Past Medical History:   Diagnosis Date     Borderline personality disorder (H)      Depressive disorder      Diverticulitis        PAST SURGICAL HISTORY:   Past Surgical History:   Procedure Laterality Date     ABDOMEN SURGERY       COLECTOMY PARTIAL       HYSTERECTOMY N/A      NOSE SURGERY N/A     due to broken nose during childhood, nose surgery reconstruction     SHOULDER SURGERY      right              Family History:   FAMILY HISTORY: History reviewed. No pertinent family history.        Social History:   SOCIAL HISTORY:   Social History     Tobacco Use     Smoking status: Current Every Day Smoker     Packs/day: 0.50     Smokeless tobacco: Never Used   Substance Use Topics     Alcohol use: Yes     Comment: drinks daily, pint a day            Physical ROS:   The patient endorsed some tremor and nausea. The remainder of 10-point review of systems was negative except as noted in HPI.         PTA Medications:     Medications Prior to Admission   Medication Sig Dispense Refill Last Dose      divalproex sodium extended-release (DEPAKOTE ER) 250 MG 24 hr tablet Take 750 mg by mouth daily   9/20/2019 at pm     PARoxetine (PAXIL) 20 MG tablet Take 20 mg by mouth every evening    9/20/2019 at pm     traZODone (DESYREL) 100 MG tablet Take 300 mg by mouth At Bedtime   9/20/2019 at pm          Allergies:     Allergies   Allergen Reactions     Penicillins Swelling     PN: allergic to mold, mold allergies got worse after she took Penicillin  allergic to mold, mold allergies got worse after she took Penicillin,        Macrolides      Metronidazole Nausea and Vomiting     Telbivudine      Valium [Diazepam] Other (See Comments)     manic     Clarithromycin Other (See Comments) and Rash     PN:  facial skin eruption     Erythromycin Rash          Labs:     Recent Results (from the past 48 hour(s))   Alcohol breath test POCT    Collection Time: 09/21/19 12:39 PM   Result Value Ref Range    Alcohol Breath Test 0.199 weak blow 0.00 - 0.01   EKG 12-lead, tracing only    Collection Time: 09/21/19 12:49 PM   Result Value Ref Range    Interpretation ECG Click View Image link to view waveform and result    CBC with platelets differential    Collection Time: 09/21/19  1:15 PM   Result Value Ref Range    WBC 6.6 4.0 - 11.0 10e9/L    RBC Count 3.75 (L) 3.8 - 5.2 10e12/L    Hemoglobin 13.0 11.7 - 15.7 g/dL    Hematocrit 37.9 35.0 - 47.0 %     (H) 78 - 100 fl    MCH 34.7 (H) 26.5 - 33.0 pg    MCHC 34.3 31.5 - 36.5 g/dL    RDW 14.8 10.0 - 15.0 %    Platelet Count 285 150 - 450 10e9/L    Diff Method Automated Method     % Neutrophils 57.3 %    % Lymphocytes 30.5 %    % Monocytes 11.2 %    % Eosinophils 0.3 %    % Basophils 0.2 %    % Immature Granulocytes 0.5 %    Nucleated RBCs 0 0 /100    Absolute Neutrophil 3.8 1.6 - 8.3 10e9/L    Absolute Lymphocytes 2.0 0.8 - 5.3 10e9/L    Absolute Monocytes 0.7 0.0 - 1.3 10e9/L    Absolute Eosinophils 0.0 0.0 - 0.7 10e9/L    Absolute Basophils 0.0 0.0 - 0.2 10e9/L    Abs Immature  Granulocytes 0.0 0 - 0.4 10e9/L    Absolute Nucleated RBC 0.0    INR    Collection Time: 09/21/19  1:15 PM   Result Value Ref Range    INR 0.97 0.86 - 1.14   Comprehensive metabolic panel    Collection Time: 09/21/19  1:15 PM   Result Value Ref Range    Sodium 139 133 - 144 mmol/L    Potassium 6.2 (HH) 3.4 - 5.3 mmol/L    Chloride 99 94 - 109 mmol/L    Carbon Dioxide 31 20 - 32 mmol/L    Anion Gap 9 3 - 14 mmol/L    Glucose 81 70 - 99 mg/dL    Urea Nitrogen 11 7 - 30 mg/dL    Creatinine 0.38 (L) 0.52 - 1.04 mg/dL    GFR Estimate >90 >60 mL/min/[1.73_m2]    GFR Estimate If Black >90 >60 mL/min/[1.73_m2]    Calcium 8.9 8.5 - 10.1 mg/dL    Bilirubin Total 0.3 0.2 - 1.3 mg/dL    Albumin 3.4 3.4 - 5.0 g/dL    Protein Total 7.3 6.8 - 8.8 g/dL    Alkaline Phosphatase 65 40 - 150 U/L    ALT 48 0 - 50 U/L    AST Unsatisfactory specimen - hemolyzed 0 - 45 U/L   Magnesium    Collection Time: 09/21/19  1:15 PM   Result Value Ref Range    Magnesium 1.8 1.6 - 2.3 mg/dL   Valproic acid (Depakote level)    Collection Time: 09/21/19  1:15 PM   Result Value Ref Range    Valproic Acid Level 61 50 - 100 mg/L   Potassium    Collection Time: 09/21/19  2:00 PM   Result Value Ref Range    Potassium 3.3 (L) 3.4 - 5.3 mmol/L   UA with Microscopic    Collection Time: 09/21/19  3:05 PM   Result Value Ref Range    Color Urine Yellow     Appearance Urine Clear     Glucose Urine 150 (A) NEG^Negative mg/dL    Bilirubin Urine Negative NEG^Negative    Ketones Urine Negative NEG^Negative mg/dL    Specific Gravity Urine 1.018 1.003 - 1.035    Blood Urine Negative NEG^Negative    pH Urine 6.5 5.0 - 7.0 pH    Protein Albumin Urine Negative NEG^Negative mg/dL    Urobilinogen mg/dL Normal 0.0 - 2.0 mg/dL    Nitrite Urine Negative NEG^Negative    Leukocyte Esterase Urine Negative NEG^Negative    Source Unspecified Urine     WBC Urine <1 0 - 5 /HPF    RBC Urine <1 0 - 2 /HPF    Squamous Epithelial /HPF Urine 3 (H) 0 - 1 /HPF    Mucous Urine Present (A)  "NEG^Negative /LPF   Drug abuse screen 6 urine (chem dep)    Collection Time: 09/21/19  3:05 PM   Result Value Ref Range    Amphetamine Qual Urine Negative NEG^Negative    Barbiturates Qual Urine Negative NEG^Negative    Benzodiazepine Qual Urine Negative NEG^Negative    Cannabinoids Qual Urine Negative NEG^Negative    Cocaine Qual Urine Negative NEG^Negative    Ethanol Qual Urine Positive (A) NEG^Negative    Opiates Qualitative Urine Negative NEG^Negative   GGT    Collection Time: 09/22/19  6:39 AM   Result Value Ref Range    GGT 79 (H) 0 - 40 U/L   Lipid panel    Collection Time: 09/22/19  6:39 AM   Result Value Ref Range    Cholesterol 192 <200 mg/dL    Triglycerides 54 <150 mg/dL    HDL Cholesterol 89 >49 mg/dL    LDL Cholesterol Calculated 92 <100 mg/dL    Non HDL Cholesterol 103 <130 mg/dL   TSH with free T4 reflex and/or T3 as indicated    Collection Time: 09/22/19  6:39 AM   Result Value Ref Range    TSH 3.57 0.40 - 4.00 mU/L   Vitamin B12    Collection Time: 09/22/19  6:39 AM   Result Value Ref Range    Vitamin B12 778 193 - 986 pg/mL   Potassium    Collection Time: 09/22/19  6:39 AM   Result Value Ref Range    Potassium 3.7 3.4 - 5.3 mmol/L          Physical and Psychiatric Examination:     BP (!) 147/91   Pulse 102   Temp 96.8  F (36  C) (Tympanic)   Resp 16   Ht 1.676 m (5' 6\")   Wt 50.3 kg (111 lb)   SpO2 92%   BMI 17.92 kg/m    Weight is 111 lbs 0 oz  Body mass index is 17.92 kg/m .    Physical Exam:  I have reviewed the physical exam as documented by ED provider and agree with findings and assessment and have no additional findings to add at this time.    Mental Status Exam:  Appearance: dressed in hospital scrubs and appeared as age stated  Attitude:  cooperative  Eye Contact:  fair  Mood:  anxious  Affect:  mood congruent  Speech:  clear, coherent  Language: fluent and intact in English  Psychomotor, Gait, Musculoskeletal:  Mild tremor, otherwise unremarkable   Throught Process:  logical, " linear and goal oriented  Associations:  no loose associations  Thought Content:  no evidence of suicidal ideation or homicidal ideation, no evidence of psychotic thought, no auditory hallucinations present and no visual hallucinations present  Insight:  fair  Judgement:  fair  Oriented to:  time, person, and place  Attention Span and Concentration:  fair  Recent and Remote Memory:  fair  Fund of Knowledge:  appropriate         Admission Diagnoses:    Alcohol Dependence with withdrawal and complication   Alcohol Use Disorder, severe  Depressive Disorder  History of Seizure Disorder           Assessment & Plan:     Assessment/Plan:  Patient being treated for alcohol withdrawal with MSSA with Ativan. She seems to have had some kind of intolerable reaction in the past after taking Valium, which seems to be more disinhibition rather than a allergic reaction. For this reason, she is detoxed with Ativan, which she seems to tolerate thusfar. Will continue her PTA meds, includng Trazodone 300 mg, Depakote 750 mg, and Paxil 20 mg        Legal:  Voluntary     Disposition:  Pending stability        Santos Beck MD  [facility] Psychiatry

## 2019-09-22 NOTE — PROGRESS NOTES
SPIRITUAL HEALTH SERVICES  SPIRITUAL ASSESSMENT Progress Note  [facility] ([facility]) 3AW   ON-CALL VISIT    On call visit due to pt reqeust for  support on admission.    Brief check-in with pt who was resting and asked me to come later. I have not been able to return today and am re-entering the request for unit  follow up.     I have informed the unit  of care provided.    Anais Robbins MDiv  Associate   Pager [phone]

## 2019-09-22 NOTE — CONSULTS
[facility]  Internal Medicine Consult     Celia Jeronimo MRN# [id number]   Age: 54 year old YOB: 1965     Date of Admission: 9/21/2019  Date of Consult: 9/22/2019    Primary Care Provider: [facility]    Requesting Service: Dr. Garrison  Reason for Consult: General Medical Evaluation      SUBJECTIVE   CC:   Cellulitis    Assessment and Plan/Recommendations:   Celia Jeronimo is a 54 year old female with history of seizure disorder, alcohol abuse, and bipolar disorder who was admitted to station 3A with acute alcohol withdrawal    Bipolar disorder, alcohol abuse: With acute withdrawal. Drinking 1 pint vodka daily PTA  - Management per psych     Abdominal cellulitis: 2/2 fall onto an antique chair PTA. Started on PO clindamycin in the ED. Erythema appears to be retracting from borders drawn in ED. Afebrile. Mild drainage  - Continue Clindamycin  - Bactroban bid  - Contact medicine if erythema extends beyond borders, patient develops fever, new drainage, or other concerning symptoms     ?Hypoxia, tachycardia: , O2 stats 86 on am vitals. Recheck HR 86, O2 sat 92. Asymptomatic, reports she was in bed nearly asleep when vitals where checked. Unclear if prior reading accurate. No h/o COPD but smokes 1/2 ppd. BMI normal, does not appear likely to have CYNDIE. No infectious symptoms   - Please contact medicine if O2 sats <90% or if HR>120  - Vital checks q4h x4 checks  - Follow up with PCP, may need OP PFTs    Hypokalemia: Mild. K 3.3 (prior 6.2 level hemolyzed) in the ED. She did not get replacement. Oral intake slowly improving today  - Repeat K today   ** Addendum: K is normal, medicine will sign off    Recurrent falls: CT CAP, head, and and C spine no acute findings, however, did not prior rib fractures and a right clavicle fracture with nonunion  - PT/OT  - Needs OP ortho follow up regarding clavicle fracture, consult placed     Seizure disorder:  Admitted 12/2018 following seizure. Felt unrelated to etoh. Follows OP with [facility]. EEG 7/2019 with L posterior temporal dysfunction. PTA on Depakote (level 61 on admission). Contact medicine if seizure-like activity noted. Continue Depakote     QTc prolongation: QTc 466 on EKG. EKG 12/2018 via [facility]where reports prolonged QTc but does not give value. Avoid prolonging meds as able. Suggest repeating EKG should new QTc prolonging meds be added    H/o diverticulitis: With prior abscess s/p partial colectomy. CT AP with mild wall thickening involving ascending colon and cecum. No significant pericolonic fat stranding. Findings may suggest  mild infectious versus inflammatory colitis. Patient is asymptomatic. No change in bowels, denies melena or hematochezia. WBC normal, afebrile. Per ED note, this was not felt to be c/w colitis, thus no intervention initiated  - No indication for new treatment changes at this time, but please contact medicine with acute changes     Plan as above was discussed with Dr. Antonio     Medicine will follow K with plans to sign off if stable. Please contact if future questions or concerns arise. Thank you for the opportunity to be a part of this patient's care.      Rola Wallace PA-C  Internal Medicine [facility]  [phone]  September 22, 2019         HPI:   Celia Jeronimo is a 54 year old female with history of seizure disorder, alcohol abuse, and bipolar disorder who was admitted to station 3A with acute alcohol withdrawal    Patient reports she has had several falls while intoxicated. She reports pain in the ribs and right shoulder. She confirms infection of the abdomen. She is nondescript in details of the fall. She denies fever or chills. No increased drainage, pain or spread of redness of the wound. She states she is a daily smoker and has a chronic smoker cough. Denies h/o COPD. Denies URI symptoms, new dyspnea, ZHU, chest pain, or change in cough.  "She reports she was pretty much asleep with am vital checks today. She denies feeling any chest pain, chest tightness, dyspnea, palpations. She is not aware of snoring. Oral intake has been poor but is slowly improving.        Past Medical History:     Past Medical History:   Diagnosis Date     Borderline personality disorder (H)      Depressive disorder      Diverticulitis         Reviewed and updated in Epic.     Past Surgical History:      Past Surgical History:   Procedure Laterality Date     ABDOMEN SURGERY       HYSTERECTOMY N/A      NOSE SURGERY N/A     due to broken nose during childhood, nose surgery reconstruction     SHOULDER SURGERY      right          Social History:   Tobacco use: 1/2 ppd  Alcohol use: As above  Elicit drug use: Denies     Family History:   History reviewed. No pertinent family history.      Allergies:     Allergies   Allergen Reactions     Penicillins Swelling     PN: allergic to mold, mold allergies got worse after she took Penicillin  allergic to mold, mold allergies got worse after she took Penicillin,        Macrolides      Metronidazole Nausea and Vomiting     Telbivudine      Valium [Diazepam] Other (See Comments)     manic     Clarithromycin Other (See Comments) and Rash     PN:  facial skin eruption     Erythromycin Rash         Medications:   Reviewed. Please see MAR     Review of Systems:   10 point ROS of systems including Constitutional, Eyes, Respiratory, Cardiovascular, Gastroenterology, Genitourinary, Integumentary, Muscularskeletal, Psychiatric were all negative except for pertinent positives noted in my HPI.    OBJECTIVE   Physical Exam:   Vitals were reviewed  Blood pressure (!) 150/97, pulse 103, temperature 96.7  F (35.9  C), temperature source Oral, resp. rate 16, height 1.676 m (5' 6\"), weight 50.3 kg (111 lb), SpO2 (!) 86 %.  General: Alert and oriented x3, in bed  HEENT: Anicteric sclera, EOMI, membranes moist  Cardiovascular: RRR, S1S2. No murmur noted  Lungs: " CTAB without wheezing or crackles. Breathing comfortably on room air  GI: Abdomen soft. Minimal tenderness near cellulitic area. Bowel sounds present. No guarding or rebound present  Vascular: No peripheral edema, distal pulses palpable  Neurologic: No focal deficits, CN II-XII grossly intact  Neuropsychiatric: Per psych  Skin: No jaundice, rashes, or lesions. Abdominal cellulitis. Borders are drawn and erythema is retracting. Mild serous drainage without overt pus. Large midline surgical scar in the lower abdomen          Data:        Lab Results   Component Value Date     09/21/2019    Lab Results   Component Value Date    CHLORIDE 99 09/21/2019    Lab Results   Component Value Date    BUN 11 09/21/2019      Lab Results   Component Value Date    POTASSIUM 3.3 09/21/2019    Lab Results   Component Value Date    CO2 31 09/21/2019    Lab Results   Component Value Date    CR 0.38 09/21/2019        Lab Results   Component Value Date    WBC 6.6 09/21/2019    HGB 13.0 09/21/2019    HCT 37.9 09/21/2019     (H) 09/21/2019     09/21/2019     Lab Results   Component Value Date    WBC 6.6 09/21/2019

## 2019-09-23 LAB — INTERPRETATION ECG - MUSE: NORMAL

## 2019-09-23 PROCEDURE — 25000132 ZZH RX MED GY IP 250 OP 250 PS 637: Performed by: PSYCHIATRY & NEUROLOGY

## 2019-09-23 PROCEDURE — 99233 SBSQ HOSP IP/OBS HIGH 50: CPT | Performed by: PSYCHIATRY & NEUROLOGY

## 2019-09-23 PROCEDURE — 12800008 ZZH R&B CD ADULT

## 2019-09-23 PROCEDURE — 25000132 ZZH RX MED GY IP 250 OP 250 PS 637: Performed by: PHYSICIAN ASSISTANT

## 2019-09-23 RX ADMIN — LORAZEPAM 1 MG: 1 TABLET ORAL at 04:13

## 2019-09-23 RX ADMIN — DIVALPROEX SODIUM 750 MG: 250 TABLET, FILM COATED, EXTENDED RELEASE ORAL at 20:26

## 2019-09-23 RX ADMIN — CLINDAMYCIN HYDROCHLORIDE 300 MG: 300 CAPSULE ORAL at 16:18

## 2019-09-23 RX ADMIN — FOLIC ACID 1 MG: 1 TABLET ORAL at 08:17

## 2019-09-23 RX ADMIN — CLINDAMYCIN HYDROCHLORIDE 300 MG: 300 CAPSULE ORAL at 00:40

## 2019-09-23 RX ADMIN — CLINDAMYCIN HYDROCHLORIDE 300 MG: 300 CAPSULE ORAL at 08:17

## 2019-09-23 RX ADMIN — PAROXETINE 20 MG: 10 TABLET, FILM COATED ORAL at 20:26

## 2019-09-23 RX ADMIN — TRAZODONE HYDROCHLORIDE 300 MG: 150 TABLET, FILM COATED ORAL at 20:27

## 2019-09-23 RX ADMIN — THIAMINE HCL TAB 100 MG 100 MG: 100 TAB at 08:17

## 2019-09-23 RX ADMIN — LORAZEPAM 2 MG: 1 TABLET ORAL at 00:31

## 2019-09-23 RX ADMIN — MUPIROCIN: 20 OINTMENT TOPICAL at 08:23

## 2019-09-23 RX ADMIN — MULTIPLE VITAMINS W/ MINERALS TAB 1 TABLET: TAB at 08:17

## 2019-09-23 RX ADMIN — LORAZEPAM 1 MG: 1 TABLET ORAL at 08:17

## 2019-09-23 NOTE — PLAN OF CARE
"  Problem: Substance Withdrawal  Goal: Substance Withdrawal  Description  Signs and symptoms of listed problems will be absent or manageable.  Outcome: No Change; Pt. Continues in alcohol withdrawal; MSSA 9 and 7; 1 mg of Ativan given.  Goal: Social and Therapeutic (Substance Withdrawal)  Description  Signs and symptoms of listed problems will be absent or manageable.  Outcome: No Change; pt remains isolative and withdrawn to room. Pt. Feels \"weak,\" from alcohol use and detox. Pt. Unable to participate in groups and programming at this time.      Behavioral  Pt. Denied SI, SIB, HI, and hallucinations; Pt. Isolative and withdrawn; affect flat and blunted; Pt. Slow to respond; pt. At times confused and forgetful; pt. Oriented x 3; Pt. Unsteady and finding it difficulty to ambulate.  She indicated she feels \"weak.\" She had an episode of incontinence of urine and stool.      Medical  Pt. has multiple areas of bruising on her head as well as bilateral rib pain and a wound to the epigastrium from falling onto a chair.  Patient went for a CT of the head, chest, abdomen and pelvis which show subacute to chronic left lateral and posterior rib fractures but no acute fractures.     She has a wound on her abdomen with erythema surrounding it; it is  tender to palpation. The wound was irrigated with bacitracin placed.  Wound edges marked. Currently being treated with oral antibiotics.    History of seizures; on Depakote for prevention (level     Pt. Continues in alcohol withdrawal; MSSA 9 and 7; 1 mg of Ativan given.    Pt. Had an episode of incontinence of urine and stool this shift; clothing and linens changed.    Dietary Consult rescheduled d/t patient being unable to participate at this time.    Plan  Pt. Would like to go to [facility] after discharge from detox.  Monitor 02 sats. Please contact medicine if 02 sats <90% or if HR > 120;  Dietary consult.  Medical bed ordered. Pt. Will move to room closest to nursing " station.   Will continue to monitor.

## 2019-09-23 NOTE — PROGRESS NOTES
"CLINICAL NUTRITION SERVICES - ASSESSMENT NOTE     Nutrition Prescription    RECOMMENDATIONS FOR MDs/PROVIDERS TO ORDER:  None at this time     Malnutrition Status:    Unable to determine due to no documented wt history and pt was unwilling to remove blankets for a physical assessment     Recommendations already ordered by Registered Dietitian (RD):  Order TID Boost Plus (vary flavors) in between meals     Future/Additional Recommendations:  Continue to monitor PO intake and wt trends  Adjust supplement regimen as desired by the pt     REASON FOR ASSESSMENT  Celia Jeronimo is a/an 54 year old female assessed by the dietitian for Provider Order - assess Pt intake and add boost on her plate    NUTRITION HISTORY  PMH: alcohol use disorder, mood disorder, seizure disorder history and borderline personality disorder who is admitted on a voluntary basis for detox from alcohol    Pt reports eating TID meals at home with no snacking. For breakfast she would have a Boost, yogurt, and juice. Lunch is often fruit and dinner, the largest meal of the day, may be pesto. Throughout the day she may drink water of Powerade. The pt's  does the grocery shopping the food preparation at home. The pt has struggled with appetite and taste changes for some time. She has gotten to the point were she doesn't want to eat but \"forces\" herself to when she knows she should.      CURRENT NUTRITION ORDERS  Diet: Regular  Intake/Tolerance: The pt reports still having a poor appetite but that her taste changes have improved. She is eating TID while admitted but says the portions are too large and she has only been eating about 50%. The pt drinks Boost at home and would like to have it here as well.      LABS  Labs reviewed    MEDICATIONS  Medications reviewed    ANTHROPOMETRICS  Height: 167.6 cm (5' 6\")  Most Recent Weight: 50.3 kg (111 lb)    IBW: 59.1 kg (85%)  BMI: 17.9 Underweight BMI <18.5  Weight History: No wt history between care " everywhere and chart review. The pt reports gaining wt recently. She says she weighed 95# 1 year ago, was really unhealthy, but has since worked on eating more and has gained wt.   Wt Readings from Last 10 Encounters:   09/21/19 50.3 kg (111 lb)     Dosing Weight: 50 kg (actual)     ASSESSED NUTRITION NEEDS  Estimated Energy Needs: 7889-2678 kcals/day (30 - 35 kcals/kg )  Justification: Repletion  Estimated Protein Needs: 50-60 grams protein/day (1 - 1.2 grams of pro/kg)  Justification: Repletion  Estimated Fluid Needs: (1 mL/kcal)   Justification: Maintenance    PHYSICAL FINDINGS  See malnutrition section below.    MALNUTRITION  % Intake: </=75% for >/= 1 month (severe)  % Weight Loss: Unable to assess - no documented wt history - low BMI of 17.9 kg/m2  Subcutaneous Fat Loss: Unable to assess - pt totally cover with blankets and unwilling to remove them  Muscle Loss: Unable to assess - pt totally cover with blankets and unwilling to remove them  Fluid Accumulation/Edema: None noted  Malnutrition Diagnosis: Unable to determine due to no documented wt history and pt was unwilling to remove blankets for a physical assessment     NUTRITION DIAGNOSIS  Inadequate protein-energy intake related to poor appetite and taste changes as evidenced by pt consuming <75% of needs for >1 month.      INTERVENTIONS  Implementation  - Nutrition Education: Provided education on the importance of adequate protein/energy intake   - Medical food supplement therapy - TID Boost Plus as snack      Goals  Patient to consume % of nutritionally adequate meal trays TID, or the equivalent with supplements/snacks.     Monitoring/Evaluation  Progress toward goals will be monitored and evaluated per protocol.      Lula Lozano RD, LD  Pager: [phone]

## 2019-09-23 NOTE — PROGRESS NOTES
Met with pt to imitate discharge planning.  Pt is requesting referral to [facility] OP treatment in [facility].  Coached Pt to complete paperwork.  Pt plans to work on this after she has a nap.

## 2019-09-23 NOTE — PLAN OF CARE
Behavioral Team Discussion: (9/23/2019)    Continued Stay Criteria/Rationale: Patient admitted for alcohol withdrawal and alcohol Use Disorder.  Plan: The following services will be provided to the patient; psychiatric assessment, medication management, therapeutic milieu, individual and group support, and skills groups.   Participants: 3A Provider: Dr. Meli MD; 3A RN's: EUSEBIO Castellanos; 3A CM's: Chelo Lang .  Summary/Recommendation: Providers will assess today for treatment recommendations, discharge planning, and aftercare plans. CM will meet with pt for discharge planning.   Medical/Physical:  She does report numerous falls recently and cannot recall the details of all of her falls.  She does have multiple areas of bruising on her head as well as bilateral rib pain and a wound to the epigastrium from falling onto a chair.  Patient went for a CT of the head, chest, abdomen and pelvis which show subacute to chronic left lateral and posterior rib fractures but no acute fractures.  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  In addition on CT she is noted to have slight thickening of the ascending colon however no other inflammatory changes and history and exam are less consistent with infectious colitis.  Baseline labs obtained for detox which are unremarkable.  Her valproate level is therapeutic.  Urine drug screen is otherwise negative.  She does have erythema surrounding the wound on her abdomen that is quite tender to palpation. The wound was thoroughly irrigated with bacitracin placed.  Will treat with oral clindamycin.  Wound edges marked.  Precautions:   Behavioral Orders   Procedures     Code 1 - Restrict to Unit     Routine Programming     As clinically indicated     Seizure precautions     Status 15     Every 15 minutes.     Withdrawal precautions     Rationale for change in precautions or plan: N/A  Progress: No Change.

## 2019-09-23 NOTE — PLAN OF CARE
PT-3A: PT eval & treat orders received. Spoke with RN who reports that patient is not appropriate for PT eval this date. Have rescheduled PT eval for 9/24/19 per RN recommendation.

## 2019-09-23 NOTE — PROVIDER NOTIFICATION
"Pt. Unsteady and finding it difficulty to ambulate.  She indicated she feels \"weak.\" She had an episode of incontinence of urine and stool. Provider notified; Medical bed ordered. Pt. Moved to room closest to nursing station.   "

## 2019-09-23 NOTE — PROGRESS NOTES
"[facility], [place]   Psychiatric Progress Note  Hospital Day: 2        Interim History:   The patient's care was discussed with the treatment team during the daily team meeting and/or staff's chart notes were reviewed.  Staff report patient continues to experience symptoms of alcohol withdrawal.  Last received Ativan 1 mg at approximately 4 AM this morning.    Upon interview, the patient reports that she is feeling \"not so good, weak.\" Pt notes that she is sleeping better in the hospital than prior to admission. She said that she is feeling \"Okay, a bit overwhelmed and a little wacky from not sleeping. But I am not depressed.\" She mentions that her house \"looks like a piece of shit, and that is not me. It brings me shame and disgust.\" The patient denies SI and HI. No AH/VH. Patient said that she would prefer to engage in CD programming at [facility] because it is close to her home. She is not willing to consider inpatient CD treatment at this time. Patient denies medication side effects and acute medical concerns. She is having some \"funny dreams, like I couldn't figure out where I was for a second but nothing too terrible.\" Appetite is improved. Patient had no further requests or concerns.            Medications:       clindamycin  300 mg Oral Q8H CHRISTINA     divalproex sodium extended-release  750 mg Oral QPM     folic acid  1 mg Oral Daily     multivitamin w/minerals  1 tablet Oral Daily     mupirocin   Topical Daily     PARoxetine  20 mg Oral QPM     traZODone  300 mg Oral At Bedtime     vitamin B1  100 mg Oral Daily          Allergies:     Allergies   Allergen Reactions     Penicillins Swelling     PN: allergic to mold, mold allergies got worse after she took Penicillin  allergic to mold, mold allergies got worse after she took Penicillin,        Macrolides      Metronidazole Nausea and Vomiting     Telbivudine      Valium [Diazepam] Other (See Comments)     manic     " "Clarithromycin Other (See Comments) and Rash     PN:  facial skin eruption     Erythromycin Rash          Labs:   No results found for this or any previous visit (from the past 24 hour(s)).       Psychiatric Examination:     BP (!) 132/95   Pulse 66   Temp 97.1  F (36.2  C) (Oral)   Resp 16   Ht 1.676 m (5' 6\")   Wt 50.3 kg (111 lb)   SpO2 97%   BMI 17.92 kg/m    Weight is 111 lbs 0 oz  Body mass index is 17.92 kg/m .    Weight over time:  Vitals:    09/21/19 1237   Weight: 50.3 kg (111 lb)       Orthostatic Vitals     None            Cardiometabolic risk assessment. 09/23/19      Reviewed patient profile for cardiometabolic risk factors    Date taken /Value  REFERENCE RANGE   Abdominal Obesity  (Waist Circumference)   See nursing flowsheet Women ?35 in (88 cm)   Men ?40 in (102 cm)      Triglycerides  Triglycerides   Date Value Ref Range Status   09/22/2019 54 <150 mg/dL Final       ?150 mg/dL (1.7 mmol/L) or current treatment for elevated triglycerides   HDL cholesterol  HDL Cholesterol   Date Value Ref Range Status   09/22/2019 89 >49 mg/dL Final       ]   Women <50 mg/dL (1.3 mmol/L) in women or current treatment for low HDL cholesterol  Men <40 mg/dL (1 mmol/L) in men or current treatment for low HDL cholesterol     Fasting plasma glucose (FPG) Lab Results   Component Value Date    GLC 81 09/21/2019      FPG ?100 mg/dL (5.6 mmol/L) or treatment for elevated blood glucose   Blood pressure  BP Readings from Last 3 Encounters:   09/23/19 (!) 132/95    Blood pressure ?130/85 mmHg or treatment for elevated blood pressure   Family History  See family history     Mental Status Exam:  Appearance: dressed in hospital scrubs and appeared older than stated age. Thin appearing, disheveled.   Attitude:  cooperative  Eye Contact:  fair  Mood:  anxious  Affect:  mood congruent  Speech:  clear, coherent  Language: fluent and intact in English  Psychomotor, Gait, Musculoskeletal:  shuffling gait, using walker and moving " slowly  Throught Process:  logical, linear and goal oriented  Associations:  no loose associations  Thought Content:  no evidence of suicidal ideation or homicidal ideation, no evidence of psychotic thought, no auditory hallucinations present and no visual hallucinations present  Insight:  fair  Judgement:  fair  Oriented to:  person, place but NOT time. Patient believed it was a Sunday in October, 1918. After some prompting, she was able to correct herself.  Attention Span and Concentration: limited  Recent and Remote Memory:  fair  Fund of Knowledge:  appropriate         Precautions:     Behavioral Orders   Procedures     Code 1 - Restrict to Unit     Routine Programming     As clinically indicated     Seizure precautions     Status 15     Every 15 minutes.     Withdrawal precautions          Diagnoses:     Alcohol Use Disorder, severe, withdrawal with unspecified complication  Depressive Disorder, unspecified  History of seizure disorder         Assessment & Plan:     Assessment and hospital summary:  Celia is a 54 year old female with history of alcohol use disorder, mood disorder, seizure disorder history and borderline personality disorder who is admitted on a voluntary basis for detox from alcohol. The patient has been drinking about 1 pint of vodka daily for the past 4-6 days, last drink morning prior to admission.     Target psychiatric symptoms and interventions:  Resume MSSA protocol using Valium  Resume Paxil 20 mg daily  Reduce trazodone to 250 mg at bedtime due to potential to prolonged QTC and given borderline QTc of 466  Continue thiamine, folate, and multivitamin daily  Check Depakote Level on 9/26 (ordered)    Medical Problems and Treatments:  Followed by internal medicine.  Appreciate assistance.    Per IM note dated 9/22:  Abdominal cellulitis: 2/2 fall onto an antique chair PTA. Started on PO clindamycin in the ED. Erythema appears to be retracting from borders drawn in ED. Afebrile. Mild  drainage  - Continue Clindamycin  - Bactroban bid  - Contact medicine if erythema extends beyond borders, patient develops fever, new drainage, or other concerning symptoms      ?Hypoxia, tachycardia: , O2 stats 86 on am vitals. Recheck HR 86, O2 sat 92. Asymptomatic, reports she was in bed nearly asleep when vitals where checked. Unclear if prior reading accurate. No h/o COPD but smokes 1/2 ppd. BMI normal, does not appear likely to have CYNDIE. No infectious symptoms   - Please contact medicine if O2 sats <90% or if HR>120  - Vital checks q4h x4 checks  - Follow up with PCP, may need OP PFTs     Hypokalemia: Mild. K 3.3 (prior 6.2 level hemolyzed) in the ED. She did not get replacement. Oral intake slowly improving today  - Repeat K today   ** Addendum: K is normal, medicine will sign off     Recurrent falls: CT CAP, head, and and C spine no acute findings, however, did not prior rib fractures and a right clavicle fracture with nonunion  - PT/OT  - Needs OP ortho follow up regarding clavicle fracture, consult placed      Seizure disorder: Admitted 12/2018 following seizure. Felt unrelated to etoh. Follows OP with [facility]. EEG 7/2019 with L posterior temporal dysfunction. PTA on Depakote (level 61 on admission). Contact medicine if seizure-like activity noted. Continue Depakote      QTc prolongation: QTc 466 on EKG. EKG 12/2018 via [facility] reports prolonged QTc but does not give value. Avoid prolonging meds as able. Suggest repeating EKG should new QTc prolonging meds be added     H/o diverticulitis: With prior abscess s/p partial colectomy. CT AP with mild wall thickening involving ascending colon and cecum. No significant pericolonic fat stranding. Findings may suggest  mild infectious versus inflammatory colitis. Patient is asymptomatic. No change in bowels, denies melena or hematochezia. WBC normal, afebrile. Per ED note, this was not felt to be c/w colitis, thus no intervention initiated  -  No indication for new treatment changes at this time, but please contact medicine with acute changes      Plan as above was discussed with Dr. Antonio      Medicine will follow K with plans to sign off if stable. Please contact if future questions or concerns arise. Thank you for the opportunity to be a part of this patient's care.     Behavioral/Psychological/Social:  Encourage unit programming    Legal: Voluntary    Safety:  - Continue precautions as noted above  - Status 15 minute checks    Disposition: Pending completion of detox    Megan Garrison MD  [facility] Psychiatry

## 2019-09-24 VITALS
WEIGHT: 111 LBS | DIASTOLIC BLOOD PRESSURE: 68 MMHG | RESPIRATION RATE: 16 BRPM | SYSTOLIC BLOOD PRESSURE: 103 MMHG | HEART RATE: 85 BPM | TEMPERATURE: 97.5 F | HEIGHT: 66 IN | OXYGEN SATURATION: 94 % | BODY MASS INDEX: 17.84 KG/M2

## 2019-09-24 PROCEDURE — 25000132 ZZH RX MED GY IP 250 OP 250 PS 637: Performed by: PHYSICIAN ASSISTANT

## 2019-09-24 PROCEDURE — 99239 HOSP IP/OBS DSCHRG MGMT >30: CPT | Performed by: PSYCHIATRY & NEUROLOGY

## 2019-09-24 PROCEDURE — 25000132 ZZH RX MED GY IP 250 OP 250 PS 637: Performed by: PSYCHIATRY & NEUROLOGY

## 2019-09-24 RX ORDER — MULTIPLE VITAMINS W/ MINERALS TAB 9MG-400MCG
1 TAB ORAL DAILY
Qty: 30 TABLET | Refills: 0 | Status: SHIPPED | OUTPATIENT
Start: 2019-09-25

## 2019-09-24 RX ORDER — DIVALPROEX SODIUM 250 MG/1
750 TABLET, EXTENDED RELEASE ORAL EVERY EVENING
Qty: 30 TABLET | Refills: 0 | Status: SHIPPED | OUTPATIENT
Start: 2019-09-24

## 2019-09-24 RX ORDER — MUPIROCIN 20 MG/G
OINTMENT TOPICAL DAILY
Qty: 15 G | Refills: 0 | Status: SHIPPED | OUTPATIENT
Start: 2019-09-25

## 2019-09-24 RX ORDER — LANOLIN ALCOHOL/MO/W.PET/CERES
100 CREAM (GRAM) TOPICAL DAILY
Qty: 30 TABLET | Refills: 0 | Status: SHIPPED | OUTPATIENT
Start: 2019-09-25

## 2019-09-24 RX ORDER — PAROXETINE 20 MG/1
20 TABLET, FILM COATED ORAL EVERY EVENING
Qty: 30 TABLET | Refills: 0 | Status: SHIPPED | OUTPATIENT
Start: 2019-09-24

## 2019-09-24 RX ORDER — CLINDAMYCIN HCL 300 MG
300 CAPSULE ORAL EVERY 8 HOURS
Qty: 10 CAPSULE | Refills: 0 | Status: SHIPPED | OUTPATIENT
Start: 2019-09-24 | End: 2019-09-28

## 2019-09-24 RX ORDER — TRAZODONE HYDROCHLORIDE 100 MG/1
100 TABLET ORAL AT BEDTIME
Qty: 30 TABLET | Refills: 0 | Status: SHIPPED | OUTPATIENT
Start: 2019-09-24

## 2019-09-24 RX ADMIN — CLINDAMYCIN HYDROCHLORIDE 300 MG: 300 CAPSULE ORAL at 00:31

## 2019-09-24 RX ADMIN — MUPIROCIN: 20 OINTMENT TOPICAL at 08:53

## 2019-09-24 RX ADMIN — MULTIPLE VITAMINS W/ MINERALS TAB 1 TABLET: TAB at 08:53

## 2019-09-24 RX ADMIN — THIAMINE HCL TAB 100 MG 100 MG: 100 TAB at 08:53

## 2019-09-24 RX ADMIN — FOLIC ACID 1 MG: 1 TABLET ORAL at 08:53

## 2019-09-24 RX ADMIN — CLINDAMYCIN HYDROCHLORIDE 300 MG: 300 CAPSULE ORAL at 08:53

## 2019-09-24 ASSESSMENT — ACTIVITIES OF DAILY LIVING (ADL)
ORAL_HYGIENE: INDEPENDENT
DRESS: SCRUBS (BEHAVIORAL HEALTH)
HYGIENE/GROOMING: PROMPTS;WITH ASSISTANCE
LAUNDRY: WITH SUPERVISION

## 2019-09-24 NOTE — PROGRESS NOTES
Pt seems sedated and somnolent after trazodone when getting up to go to bathroom, speech is slow and slurred, pt was incontinent of urine. Dose may be too high. Will pass on in report and notify MD.

## 2019-09-24 NOTE — DISCHARGE INSTRUCTIONS
Behavioral [facility] Discharge Planning and Instructions  THANK YOU FOR CHOOSING THE [address]: [phone]    Summary: You were admitted to, then processed through,   on September 21, 2019 for detoxification from alcohol.  A medical examination was performed that included lab work. You have met with a  and opted to pursue outpatient treatment with [facility] in [place].  Please make your recovery a priority, Miss Jeronimo.     You are scheduled to complete a chemical health assessment with [facility] tomorrow, Wednesday, September 25th @ 1:00 pm.  If you are unable to keep this appointment please call them to reschedule.  [facility]. [place]  [address], [address]  [place]  [address]  Phone: [phone]  Fax: [phone]    Main Diagnosis:  Per Dr. Garrison psychiatrist  Alcohol Use Disorder, severe, withdrawal with unspecified complication  Depressive Disorder, unspecified  History of seizure disorder    Major Treatments, Procedures and Findings:  You were detoxified from alcohol using ativan and the Modified Selective Severity Assessment (MSSA) protocol    You are scheduled for a chemical dependency assessment (see above).  You have had blood drawn, and the results have been reviewed with you.  Please take a copy of your laboratory work with you to your next provider appointment.    Please review handout on cellulitis.  Medicine is recommending follow up with orthopedics, get a pulmonary function test.      Symptoms to Report:  If you experience more anxiety, confusion, sleeplessness, deep sadness or thoughts of suicide, notify your treatment team or notify your primary care physician. IF THE SYMPTOMS YOU ARE EXPERIENCING ARE A MEDICAL EMERGENCY, CALL 911 IMMEDIATELY.     Lifestyle Adjustment: Health Action Plan:  1.Create a daily schedule  2. Eat Healthy  3. Plan Enjoyable Sober Activities  4. Use Problem Solving Skills and Deal with Issues  as they Arise.   5. Be Physically Active  6. Take your medications as prescribed  7. Get enough restful sleep  8. Practice Relaxation  9. Spend time with Supportive People  10. No use of alcohol, illegal drugs or addictive medications other than what is currently prescribed.   11.AA, NA Sponsor are excellent resources for support      Keeping hands clean is one of the most important steps we can take to avoid getting sick and spreading germs to others.  Please wash your hands frequently.       Primary Provider:  [facility] in [place], [place]   Address: [address] [place] [address], [place] [address]   Hours:   Open ? Closes 5PM     phone : [phone]    Tuesday, October 1 st @ 10:30  Am           Resources:  [facility] [phone] Support Group:  AA/NA and Sponsor/support.  Crisis Intervention: 760.848.7102 or 503-004-3929. Call anytime for help.  National [place] on Mental Illness (www.[place].sukhjinder.org): 507.202.8022 or 410-180-5245.  Alcoholics Anonymous (www.alcoholics-anonymous.org): Check your phone book for your local chapter.  Suicide Awareness Voices of Education (www.save.org): 918.966.1163  National Suicide Prevention Line (www.mentalhealthmn.org): 532.191.4156  Mental Health Consumer/Survivor Network of [place] (www.mhcsn.net): 799.450.5620 or 681-294-7887.  Mental Health Association of [place] (www.mentalhealth.org): 710.133.7605 or 676-980-0288  Substance Abuse and Mental Health Services. (www.samhsa.gov)    Senior LinkAge Line   The free call that does it all!  1-242.687.3904  The Senior LinkAge Line  is the [place] Board on Aging's free statewide information and assistance service. The Senior LinkAge Line  service is provided by six Area Agencies on Aging that cover all [address] [place] [address] of [place] and helps connect you to local services.   The Senior LinkAge Line  is answered from 8 a.m. to 4:30 p.m. weekdays.         [facility]  Connection ([facility])  [facility] connects people seeking recovery to resources that help foster and sustain long-term recovery.  Whether you are seeking resources for treatment, transportation, housing, job training, education, health care or other pathways to recovery, [facility] is a great place to start. 694.290.6380 www.[facility].org    General Medication Instruction: See your medication papers for instructions. Take all medicines as directed.  Make no changes unless your doctor suggests them. Go to all your doctor visits.  Be sure to have all your required lab tests. This way, your medicines may be refilled on time. Do not use any drugs not prescribed by your provider. AA/NA and sponsors are excellent resources for support. Avoid alcohol at all costs!    Please Note:  If you have any questions at anytime after you are discharged please call the [facility] [place] detoxification [address] at [phone].  [facility], Behavioral Intake [phone]. Please take this discharge folder with you to all your follow up appointments, it contains your lab results, diagnosis, medication list and discharge recommendations.    THANK YOU FOR CHOOSING THE [facility]

## 2019-09-24 NOTE — PLAN OF CARE
Problem: Substance Withdrawal  Goal: Substance Withdrawal  Description  Signs and symptoms of listed problems will be absent or manageable. 1. Detoxification from Alcohol using the [facility] ativan protocol  2. Patient will complete assessment paperwork  3. Patient will meet with  to discuss treatment and discharge planning  4. Physical examination and Lab evaluation  5. Patients oral intake will be greater than 75 % of meals to meet estimated needs  6. Adequate fluid intake     Outcome: Adequate for Discharge   Pt being discharged to home. Pt alcohol withdrawal is complete. Pt has received a total of 16 mg of ativan since admission. PT states she feels tired and foggy she believes from the medications.   Pt gait is slow but steady. Pt using walker on unit. Wide based gait   Pt up on unit independently.   Pt has been sleeping off and on during the shift. Pt did not want to shower today as she felt too tired.   Pt signed a DENISHA for her SO Navi. Writer spoke with Mcelroy and he will be picking her up and staying with her.   Navi was informed of patients upcoming appointments.   Some of patients meds too soon fill. Pt has them here from home on unit and was given pharmacy contact information if needs meds filled at a later time.   Pt has dime size abrasion of abdomen. Area cleaned and band aid applied.  Pt has cream and is on antibiotics.   PT provided handout on COPD and cellulitis.   PT o2 sat have run low. PT used inspirometer and will take home.   Reviewed discharge instructions with patient and she verbalized understanding.   Pt informed of recommendation to take AVS to appointments and need for PFT ortho follow up.   Pt has a intake tomorrow for CD outpatient program.   Medicine cleared patient for discharge.   See discharge instructions.   Pt alert and orientated x 3 but has been having some confusion yesterday and evening.   Pt's trazodone dose decreased per Dr. Staton.

## 2019-09-24 NOTE — PROGRESS NOTES
Pt attempted paperwork but missed multiple questions as well as whole pages.  Pt answers at times appear confused.  Pt not workable at this time due to weakened condition and difficulty tracking.  Will assess Pt's ability to complete paperwork today.

## 2019-09-24 NOTE — PROGRESS NOTES
Pt is scheduled for an assessment at [facility] in [place] on 9/25/19 at 1300.  AVS updated.

## 2019-09-24 NOTE — DISCHARGE SUMMARY
Admit Date:     09/21/2019   Discharge dx  Alcohol Use Disorder, severe  Depressive Disorder  History of Seizure Disorder          More than 35 minutes spent on discharge summary, doing the discharge instructions, discharge medications, discharge mental status examination.       DISCHARGE MEDICATIONS:  Please see detailed admission note by Dr. Beck 09/22/2019.      HOSPITAL COURSE:  During the hospitalization, the patient had a prolonged detox. It was complicated by the fact that she needed OT consults; OT finally signed her off on the day of discharge and saying she does not need home OT.  She was also seen by Rola Wallace PA-C on 09/22 and today she was seen by Internal Medicine and they cleared her.  During the hospitalization, the patient had lab work done, which shows a normal comprehensive metabolic panel, GGT is 79.  During hospitalization, the patient's energy, motivation, sleep and interest improved.  She did not have any suicidal or homicidal ideation, plan or intent.  She met with the  and the plan is to do outpatient treatment.      DISCHARGE DISPOSITION:  The patient going home.      DISCHARGE FOLLOWUP:  With [facility] 09/25 at 1:00 p.m.  She also needs to see a primary care appointment.  The patient does not have any active suicidal or homicidal ideation, plan or intent.  During her hospitalization, the patient was continued on Depakote and she was continued on Paxil.  She does not have any suicidal or homicidal ideation, plan or intent.  She does have a history of seizure disorder; this also complicated withdrawal.  Trazodone was reduced from 300 to 100.               DISCHARGE MENTAL STATUS EXAMINATION:  The patient is alert, oriented x3.  Good fund of knowledge.  Good use of language.  Recent and remote memory, language, fund of knowledge are all adequate.  Euthymic mood congruent affect  Speech normal rate/rhythm linear tp no loose asso,The patient does not have any active  suicidal or homicidal ideation.  Does not have any auditory or visual hallucination.  Fair insight/judgment At this time, the patient was stable to be discharged.        Pt was not determined to not be a danger to himself or others. At the current time of discharge, the patient does not meet criteria for involuntary hospitalization. On the day of discharge, the patient reports that they do not have suicidal or homicidal ideation and would never hurt themselves or others. Steps taken to minimize risk include: assessing patient s behavior and thought process daily during hospital stay, discharging patient with adequate plan for follow up for mental and physical health and discussing safety plan of returning to the hospital should the patient ever have thoughts of harming themselves or others. Therefore, based on all available evidence including the factors cited above, the patient does not appear to be at imminent risk for self-harm, and is appropriate for outpatient level of care.     Educated about side effects/risk vs benefits /alternative including non treatment.Pt consented to be on medication.     .Total time spent on discharge summary more than 35 min  More than  20 min  planning, coordination of care, medication reconciliation and performance of physical exam on day of discharge.Care was coordinated with unit RN and unit therapist       Celia Jeronimo Medication Instructions LOBITO:[id number]    Printed on:09/26/19 1240   Medication Information                      clindamycin (CLEOCIN) 300 MG capsule  Take 1 capsule (300 mg) by mouth every 8 hours for 10 doses             divalproex sodium extended-release (DEPAKOTE ER) 250 MG 24 hr tablet  Take 3 tablets (750 mg) by mouth every evening             multivitamin w/minerals (THERA-VIT-M) tablet  Take 1 tablet by mouth daily             mupirocin (BACTROBAN) 2 % external ointment  Apply topically daily             PARoxetine (PAXIL) 20 MG tablet  Take 1  tablet (20 mg) by mouth every evening             traZODone (DESYREL) 100 MG tablet  Take 1 tablet (100 mg) by mouth At Bedtime             vitamin B1 (THIAMINE) 100 MG tablet  Take 1 tablet (100 mg) by mouth daily                Primary Provider:  Kym Lee [facility] in [place], [place]   Address: [address] [place] [address], [place] [address]   Hours:   Open ? Closes 5PM      phone : [phone]      @ 10:30  Am     You are scheduled to complete a chemical health assessment with [facility] tomorrow,  @ 1:00 pm.  If you are unable to keep this appointment please call them to reschedule.  [facility] [place]  [id number] [place] [address], [address]  [place]  [address]  Phone: [phone]  Fax: [phone]         GREGG DICKERSON MD             D: 2019   T: 2019   MT: VALE      Name:     STEPHEN FRITZ   MRN:      [id number]        Account:        [id number]   :      1965           Admit Date:     2019                                  Discharge Date:       Document: [id number]

## 2019-09-24 NOTE — PLAN OF CARE
Discharge Planner PT   Patient plan for discharge: Home  Current status: PT orders received for PT evaluation and treatment: recurrent falls.  Per nursing pt is ambulating around the unit independently without FWW, ambulating slowly but steady.  Pt is discharging home today and has no PT needs.  PT orders completed and PT evaluation cancelled.   Barriers to return to prior living situation: No barriers  Recommendations for discharge: Per medicine/behavioral health staff  Rationale for recommendations: No PT needs, pt ambulating independently, discharging home.       Entered by: Tanya Charles 09/24/2019 10:09 AM

## 2019-10-27 ENCOUNTER — HEALTH MAINTENANCE LETTER (OUTPATIENT)
Age: 54
End: 2019-10-27

## 2021-01-10 ENCOUNTER — HEALTH MAINTENANCE LETTER (OUTPATIENT)
Age: 56
End: 2021-01-10

## 2021-03-13 ENCOUNTER — HEALTH MAINTENANCE LETTER (OUTPATIENT)
Age: 56
End: 2021-03-13

## 2021-05-30 ENCOUNTER — RECORDS - HEALTHEAST (OUTPATIENT)
Dept: ADMINISTRATIVE | Facility: CLINIC | Age: 56
End: 2021-05-30

## 2021-10-23 ENCOUNTER — HEALTH MAINTENANCE LETTER (OUTPATIENT)
Age: 56
End: 2021-10-23

## 2022-02-12 ENCOUNTER — HEALTH MAINTENANCE LETTER (OUTPATIENT)
Age: 57
End: 2022-02-12

## 2022-10-09 ENCOUNTER — HEALTH MAINTENANCE LETTER (OUTPATIENT)
Age: 57
End: 2022-10-09

## 2023-02-18 ENCOUNTER — HEALTH MAINTENANCE LETTER (OUTPATIENT)
Age: 58
End: 2023-02-18

## 2023-03-25 ENCOUNTER — HEALTH MAINTENANCE LETTER (OUTPATIENT)
Age: 58
End: 2023-03-25

## 2023-11-24 NOTE — ED NOTES
MD informed of critical lab. Will redraw the potassium   negative ROM intact/normal gait/strength 5/5 bilateral upper extremities/strength 5/5 bilateral lower extremities details…

## 2024-03-16 ENCOUNTER — HEALTH MAINTENANCE LETTER (OUTPATIENT)
Age: 59
End: 2024-03-16